# Patient Record
Sex: FEMALE | Race: WHITE | Employment: OTHER
[De-identification: names, ages, dates, MRNs, and addresses within clinical notes are randomized per-mention and may not be internally consistent; named-entity substitution may affect disease eponyms.]

---

## 2024-02-19 ENCOUNTER — HOSPITAL ENCOUNTER (OUTPATIENT)
Facility: HOSPITAL | Age: 74
Discharge: HOME | End: 2024-02-19
Attending: FAMILY MEDICINE
Payer: MEDICARE

## 2024-02-19 VITALS
RESPIRATION RATE: 18 BRPM | OXYGEN SATURATION: 91 % | DIASTOLIC BLOOD PRESSURE: 70 MMHG | TEMPERATURE: 97.3 F | SYSTOLIC BLOOD PRESSURE: 123 MMHG | HEART RATE: 76 BPM

## 2024-02-19 DIAGNOSIS — L97.911 CHRONIC ULCER OF RIGHT LEG, LIMITED TO BREAKDOWN OF SKIN (HCC): ICD-10-CM

## 2024-02-19 DIAGNOSIS — L97.522 DIABETIC ULCER OF TOE OF LEFT FOOT ASSOCIATED WITH TYPE 2 DIABETES MELLITUS, WITH FAT LAYER EXPOSED (HCC): ICD-10-CM

## 2024-02-19 DIAGNOSIS — L97.921 CHRONIC ULCER OF LEFT LEG, LIMITED TO BREAKDOWN OF SKIN (HCC): Primary | ICD-10-CM

## 2024-02-19 DIAGNOSIS — E11.621 DIABETIC ULCER OF TOE OF LEFT FOOT ASSOCIATED WITH TYPE 2 DIABETES MELLITUS, WITH FAT LAYER EXPOSED (HCC): ICD-10-CM

## 2024-02-19 PROCEDURE — 87186 SC STD MICRODIL/AGAR DIL: CPT

## 2024-02-19 PROCEDURE — 29581 APPL MULTLAYER CMPRN SYS LEG: CPT

## 2024-02-19 PROCEDURE — 87070 CULTURE OTHR SPECIMN AEROBIC: CPT

## 2024-02-19 PROCEDURE — 97602 WOUND(S) CARE NON-SELECTIVE: CPT

## 2024-02-19 PROCEDURE — 87077 CULTURE AEROBIC IDENTIFY: CPT

## 2024-02-19 PROCEDURE — 87147 CULTURE TYPE IMMUNOLOGIC: CPT

## 2024-02-19 PROCEDURE — 87075 CULTR BACTERIA EXCEPT BLOOD: CPT

## 2024-02-19 PROCEDURE — 87205 SMEAR GRAM STAIN: CPT

## 2024-02-19 RX ORDER — BACITRACIN ZINC AND POLYMYXIN B SULFATE 500; 1000 [USP'U]/G; [USP'U]/G
OINTMENT TOPICAL ONCE
OUTPATIENT
Start: 2024-02-19 | End: 2024-02-19

## 2024-02-19 RX ORDER — LIDOCAINE HYDROCHLORIDE 20 MG/ML
JELLY TOPICAL ONCE
OUTPATIENT
Start: 2024-02-19 | End: 2024-02-19

## 2024-02-19 RX ORDER — BETAMETHASONE DIPROPIONATE 0.5 MG/G
CREAM TOPICAL ONCE
OUTPATIENT
Start: 2024-02-19 | End: 2024-02-19

## 2024-02-19 RX ORDER — CLOBETASOL PROPIONATE 0.5 MG/G
OINTMENT TOPICAL ONCE
OUTPATIENT
Start: 2024-02-19 | End: 2024-02-19

## 2024-02-19 RX ORDER — IBUPROFEN 200 MG
TABLET ORAL ONCE
OUTPATIENT
Start: 2024-02-19 | End: 2024-02-19

## 2024-02-19 RX ORDER — LIDOCAINE 40 MG/G
CREAM TOPICAL ONCE
OUTPATIENT
Start: 2024-02-19 | End: 2024-02-19

## 2024-02-19 RX ORDER — SODIUM CHLOR/HYPOCHLOROUS ACID 0.033 %
SOLUTION, IRRIGATION IRRIGATION ONCE
OUTPATIENT
Start: 2024-02-19 | End: 2024-02-19

## 2024-02-19 RX ORDER — LIDOCAINE HYDROCHLORIDE 40 MG/ML
SOLUTION TOPICAL ONCE
OUTPATIENT
Start: 2024-02-19 | End: 2024-02-19

## 2024-02-19 RX ORDER — LIDOCAINE 50 MG/G
OINTMENT TOPICAL ONCE
OUTPATIENT
Start: 2024-02-19 | End: 2024-02-19

## 2024-02-19 RX ORDER — TRIAMCINOLONE ACETONIDE 1 MG/G
OINTMENT TOPICAL ONCE
OUTPATIENT
Start: 2024-02-19 | End: 2024-02-19

## 2024-02-19 RX ORDER — GINSENG 100 MG
CAPSULE ORAL ONCE
OUTPATIENT
Start: 2024-02-19 | End: 2024-02-19

## 2024-02-19 RX ORDER — GENTAMICIN SULFATE 1 MG/G
OINTMENT TOPICAL ONCE
OUTPATIENT
Start: 2024-02-19 | End: 2024-02-19

## 2024-02-19 NOTE — PROGRESS NOTES
Wound Center  Progress Note / Procedure Note      Chief Complaint:  Shanna Edwards is a 73 y.o.  female  with lower leg wound of >few months duration.    Referred by:  Dr POLI Huddleston DO      Assessment/Plan     73 y.o. female with     -R leg circumferential chronic ulcer.  Partial thickness  ++drainage  Red,raw, weeping++  Culture done    -L leg circumferential chronic ulcer.  Partial thickness  ++drainage  Red,raw, weeping++  Culture done    -L second toe diabetic ulcer  +pressure component  Great toenail digging into this area  Betadine wet to dry snaked through toes    -R Index finger chronic ulcer  Full thickness  Dry granular      -DM2  A1c 5.9  (10/2023)    -CKD  eGFR 31 (2/2024)    -Toenails long and curved  Causing wounds  Refer to podiatry- Dr Naylor    Following discussed with patient   Needs :  Serial debridement- prn    Good local wound care  Dressing:see nursing notes  Frequency : once weekly      -Edema management    -Good Diabetic control    -Good offloading    Patient/  understood and agrees with plan. Questions answered.    Serial visits and serial debridements also discussed.    Follow up with me in 1 week    Subjective:       HPI:     Had had wounds/weeping since September  Was too involved in taking care of spouse and neglected own health  Spouse has passed away  Pcp referred her here    Had denuded weeping raw legs bilaterally  Culture done    Alginate, 2 layer coflect    Left 2nd toe DFU  Great toeNail digging into second toe  Betadine wet to dry snaked through webspaces  Refer to Dr Naylor      History/Chart/Medications reviewed    Wound caused by:  venous, diabetic  Current wound care:See flowsheet  Offloading wound: instructions given today  Elevating legs: instructions given today  Compression compliance:instructions given today  Appetite: fair  Wound associated pain: See flowsheet  Diabetic: yes  Smoker: no  ROS: no N/V/D, no T/chills; no local rash, no chest pain +shortness

## 2024-02-21 LAB
BACTERIA SPEC CULT: NORMAL
SERVICE CMNT-IMP: NORMAL

## 2024-02-22 LAB
BACTERIA SPEC CULT: ABNORMAL
GRAM STN SPEC: ABNORMAL
GRAM STN SPEC: ABNORMAL
SERVICE CMNT-IMP: ABNORMAL

## 2024-02-22 NOTE — FLOWSHEET NOTE
02/19/24 1048   Wound 02/19/24 Pretibial Left;Anterior   Date First Assessed/Time First Assessed: 02/19/24 1047   Present on Original Admission: Yes  Primary Wound Type: Venous Ulcer  Location: Pretibial  Wound Location Orientation: Left;Anterior   Wound Image    Wound Etiology Venous   Dressing Status New dressing applied   Wound Cleansed Soap and water;Wound cleanser   Dressing/Treatment Hydrofiber Ag  (superabsorbent dressings and 2 layer coflex)   Offloading for Diabetic Foot Ulcers Offloading not required   Dressing Change Due 02/26/24   Wound Length (cm) 23 cm   Wound Width (cm) 17 cm   Wound Depth (cm) 0.1 cm   Wound Surface Area (cm^2) 391 cm^2   Wound Volume (cm^3) 39.1 cm^3   Wound Assessment Pink/red;Superficial   Drainage Amount Moderate (25-50%)   Drainage Description Serous   Odor Mild   Jaylin-wound Assessment Blanchable erythema   Margins Attached edges   Wound Thickness Description not for Pressure Injury Partial thickness   Wound 02/19/24 Toe (Comment  which one) Left;Dorsal 2nd toe   Date First Assessed/Time First Assessed: 02/19/24 1047   Present on Original Admission: Yes  Primary Wound Type: Diabetic Ulcer  Location: Toe (Comment  which one)  Wound Location Orientation: Left;Dorsal  Wound Description (Comments): 2nd toe   Wound Image    Wound Etiology Diabetic   Dressing Status New dressing applied   Wound Cleansed Wound cleanser;Soap and water   Dressing/Treatment Hydrofiber Ag   Offloading for Diabetic Foot Ulcers Felt or foam   Dressing Change Due 02/26/24   Wound Length (cm) 0.5 cm   Wound Width (cm) 1.5 cm   Wound Depth (cm) 0.2 cm   Wound Surface Area (cm^2) 0.75 cm^2   Wound Volume (cm^3) 0.15 cm^3   Wound Assessment Slough   Drainage Amount Small (< 25%)   Drainage Description Serous   Odor Mild   Jaylin-wound Assessment Blanchable erythema   Margins Defined edges   Wound Thickness Description not for Pressure Injury Full thickness   Wound 02/19/24 Pretibial Right;Dorsal   Date First

## 2024-02-22 NOTE — DISCHARGE INSTRUCTIONS
Discharge Instructions from  [facility]  [address] [address] [phone] Fax [phone]    Do not remove dressing     Return Appointment:  [] Wound and dressing supply provider:   [] ECF or Home Healthcare:  [] Wound Assessment: [] Physician or NP scheduled for Wound Assessment:   [x] Return Appointment: With MD  in  1 Week(s)  [x] Call podiatry to schedule appt for toe nail trimming       Wound Care Center Information: Should you experience any significant changes in your wound(s) or have questions about your wound care, please contact the [facility] at MONDAY - FRIDAY 8:00 am - 4:30.  If you need help with your wound outside these hours and cannot wait until we are again available, contact your PCP or go to the hospital emergency room.     PLEASE NOTE: IF YOU ARE UNABLE TO OBTAIN WOUND SUPPLIES, CONTINUE TO USE THE SUPPLIES YOU HAVE AVAILABLE UNTIL YOU ARE ABLE TO REACH US. IT IS MOST IMPORTANT TO KEEP THE WOUND COVERED AT ALL TIMES.

## 2024-02-26 ENCOUNTER — HOSPITAL ENCOUNTER (OUTPATIENT)
Facility: HOSPITAL | Age: 74
Discharge: HOME | End: 2024-02-26
Attending: FAMILY MEDICINE
Payer: MEDICARE

## 2024-02-26 VITALS
SYSTOLIC BLOOD PRESSURE: 108 MMHG | TEMPERATURE: 97.6 F | HEART RATE: 78 BPM | DIASTOLIC BLOOD PRESSURE: 61 MMHG | OXYGEN SATURATION: 92 % | RESPIRATION RATE: 18 BRPM

## 2024-02-26 DIAGNOSIS — E11.621 DIABETIC ULCER OF TOE OF LEFT FOOT ASSOCIATED WITH TYPE 2 DIABETES MELLITUS, WITH FAT LAYER EXPOSED (HCC): ICD-10-CM

## 2024-02-26 DIAGNOSIS — L97.911 CHRONIC ULCER OF RIGHT LEG, LIMITED TO BREAKDOWN OF SKIN (HCC): ICD-10-CM

## 2024-02-26 DIAGNOSIS — L97.921 CHRONIC ULCER OF LEFT LEG, LIMITED TO BREAKDOWN OF SKIN (HCC): Primary | ICD-10-CM

## 2024-02-26 DIAGNOSIS — L97.522 DIABETIC ULCER OF TOE OF LEFT FOOT ASSOCIATED WITH TYPE 2 DIABETES MELLITUS, WITH FAT LAYER EXPOSED (HCC): ICD-10-CM

## 2024-02-26 PROBLEM — R06.02 SHORTNESS OF BREATH: Status: ACTIVE | Noted: 2022-12-25

## 2024-02-26 PROBLEM — E55.9 VITAMIN D DEFICIENCY: Status: ACTIVE | Noted: 2019-06-14

## 2024-02-26 PROBLEM — I35.0 NONRHEUMATIC AORTIC VALVE STENOSIS: Status: ACTIVE | Noted: 2021-01-11

## 2024-02-26 PROBLEM — E66.01 SEVERE OBESITY (BMI 35.0-39.9) WITH COMORBIDITY (HCC): Status: ACTIVE | Noted: 2017-06-21

## 2024-02-26 PROBLEM — H04.123 DRY EYE SYNDROME OF BOTH EYES: Status: ACTIVE | Noted: 2021-03-05

## 2024-02-26 PROBLEM — I27.20 PULMONARY HYPERTENSION (HCC): Status: ACTIVE | Noted: 2023-08-25

## 2024-02-26 PROBLEM — I65.29 STENOSIS OF CAROTID ARTERY: Status: ACTIVE | Noted: 2019-04-10

## 2024-02-26 PROCEDURE — 29581 APPL MULTLAYER CMPRN SYS LEG: CPT

## 2024-02-26 RX ORDER — LIDOCAINE HYDROCHLORIDE 20 MG/ML
JELLY TOPICAL ONCE
OUTPATIENT
Start: 2024-02-26 | End: 2024-02-26

## 2024-02-26 RX ORDER — TRIAMCINOLONE ACETONIDE 1 MG/G
OINTMENT TOPICAL ONCE
OUTPATIENT
Start: 2024-02-26 | End: 2024-02-26

## 2024-02-26 RX ORDER — LIDOCAINE 50 MG/G
OINTMENT TOPICAL ONCE
OUTPATIENT
Start: 2024-02-26 | End: 2024-02-26

## 2024-02-26 RX ORDER — DOXYCYCLINE HYCLATE 100 MG
100 TABLET ORAL 2 TIMES DAILY
Qty: 28 TABLET | Refills: 0 | Status: SHIPPED | OUTPATIENT
Start: 2024-02-26 | End: 2024-03-11

## 2024-02-26 RX ORDER — LIDOCAINE 40 MG/G
CREAM TOPICAL ONCE
OUTPATIENT
Start: 2024-02-26 | End: 2024-02-26

## 2024-02-26 RX ORDER — BETAMETHASONE DIPROPIONATE 0.5 MG/G
CREAM TOPICAL ONCE
OUTPATIENT
Start: 2024-02-26 | End: 2024-02-26

## 2024-02-26 RX ORDER — GINSENG 100 MG
CAPSULE ORAL ONCE
OUTPATIENT
Start: 2024-02-26 | End: 2024-02-26

## 2024-02-26 RX ORDER — IBUPROFEN 200 MG
TABLET ORAL ONCE
OUTPATIENT
Start: 2024-02-26 | End: 2024-02-26

## 2024-02-26 RX ORDER — SODIUM CHLOR/HYPOCHLOROUS ACID 0.033 %
SOLUTION, IRRIGATION IRRIGATION ONCE
OUTPATIENT
Start: 2024-02-26 | End: 2024-02-26

## 2024-02-26 RX ORDER — GENTAMICIN SULFATE 1 MG/G
OINTMENT TOPICAL ONCE
OUTPATIENT
Start: 2024-02-26 | End: 2024-02-26

## 2024-02-26 RX ORDER — LIDOCAINE HYDROCHLORIDE 40 MG/ML
SOLUTION TOPICAL ONCE
OUTPATIENT
Start: 2024-02-26 | End: 2024-02-26

## 2024-02-26 RX ORDER — BACITRACIN ZINC AND POLYMYXIN B SULFATE 500; 1000 [USP'U]/G; [USP'U]/G
OINTMENT TOPICAL ONCE
OUTPATIENT
Start: 2024-02-26 | End: 2024-02-26

## 2024-02-26 RX ORDER — CLOBETASOL PROPIONATE 0.5 MG/G
OINTMENT TOPICAL ONCE
OUTPATIENT
Start: 2024-02-26 | End: 2024-02-26

## 2024-02-26 NOTE — DISCHARGE INSTRUCTIONS
Compression Wraps Discharge Instructions   Location: Bilateral Legs  Type: 2 layer wraps    Your doctor has ordered compression therapy for your wound. Compression bandages reduce the swelling, or edema, in your legs and prevent it from returning. The wound care staff will apply your compression wrap. It must be removed and re-applied at least weekly. As the swelling decreases, the boot no longer provides adequate compression and you need a new one. Once applied, you need to know how to take care of your compression wrap.     The boot must stay dry. Do not get it wet in the shower or tub. You may do a partial bath, or you can cover the boot with a large plastic bag, secured at the top, so that no water can get in.    Avoid standing in one place for long periods of time. If you must  one place, shift your weight and change positions often.    If you have CHF, consult your doctor before following the next two recommendations for leg elevation.     When sitting, elevate your legs on pillows, or put blocks under the foot of your bed. Your legs should be higher than your heart.    If your boot becomes painful, or you notice an increase in swelling in your toes, numbness or tingling, or purple color to your toes, remove the wrap and call the Wound Care Center. If it is after hours, call your doctor for instructions or go to the nearest emergency room.

## 2024-02-26 NOTE — FLOWSHEET NOTE
02/26/24 1059   Wound 02/19/24 Pretibial Left;Anterior   Date First Assessed/Time First Assessed: 02/19/24 1047   Present on Original Admission: Yes  Primary Wound Type: Venous Ulcer  Location: Pretibial  Wound Location Orientation: Left;Anterior   Wound Image      Wound Etiology Venous   Dressing Status Intact   Wound Cleansed Cleansed with saline   Dressing/Treatment Hydrofiber Ag  (2 layer wrap)   Offloading for Diabetic Foot Ulcers Offloading not required   Dressing Change Due 03/04/24   Wound Length (cm) 23 cm   Wound Width (cm) 17 cm   Wound Depth (cm) 0.1 cm   Wound Surface Area (cm^2) 391 cm^2   Change in Wound Size % (l*w) 0   Wound Volume (cm^3) 39.1 cm^3   Wound Healing % 0   Wound Assessment Pink/red   Drainage Amount Moderate (25-50%)   Drainage Description Serous   Odor None   Jaylin-wound Assessment Blanchable erythema   Margins Attached edges   Wound Thickness Description not for Pressure Injury Partial thickness   Wound 02/19/24 Toe (Comment  which one) Left;Dorsal 2nd toe   Date First Assessed/Time First Assessed: 02/19/24 1047   Present on Original Admission: Yes  Primary Wound Type: Diabetic Ulcer  Location: Toe (Comment  which one)  Wound Location Orientation: Left;Dorsal  Wound Description (Comments): 2nd toe   Wound Image    Wound Etiology Diabetic   Dressing Status Intact   Wound Cleansed Wound cleanser   Dressing/Treatment Betadine swabs/povidone iodine   Offloading for Diabetic Foot Ulcers Felt or foam   Dressing Change Due 03/04/24   Wound Length (cm) 0.4 cm   Wound Width (cm) 1.5 cm   Wound Depth (cm) 0.2 cm   Wound Surface Area (cm^2) 0.6 cm^2   Change in Wound Size % (l*w) 20   Wound Volume (cm^3) 0.12 cm^3   Wound Healing % 20   Wound Assessment Devitalized tissue   Drainage Amount Small (< 25%)   Drainage Description Serous   Odor None   Jaylin-wound Assessment Blanchable erythema   Margins Defined edges   Wound Thickness Description not for Pressure Injury Full thickness   Wound

## 2024-02-27 NOTE — PROGRESS NOTES
1059   Wound Healing % 0 02/26/24 1059   Wound Assessment Pink/red 02/26/24 1059   Drainage Amount Moderate (25-50%) 02/26/24 1059   Drainage Description Serous 02/26/24 1059   Odor None 02/26/24 1059   Jaylin-wound Assessment Blanchable erythema 02/26/24 1059   Margins Attached edges 02/26/24 1059   Wound Thickness Description not for Pressure Injury Partial thickness 02/26/24 1059   Number of days: 8       Wound 02/19/24 Finger (Comment which one) Right (Active)   Wound Image   02/26/24 1059   Wound Etiology Other 02/26/24 1059   Dressing Status New dressing applied 02/26/24 1059   Wound Cleansed Wound cleanser 02/26/24 1059   Dressing/Treatment Hydrofiber Ag 02/26/24 1059   Offloading for Diabetic Foot Ulcers Offloading not required 02/26/24 1059   Dressing Change Due 03/04/24 02/26/24 1059   Wound Length (cm) 0.5 cm 02/26/24 1059   Wound Width (cm) 0.5 cm 02/26/24 1059   Wound Depth (cm) 0.2 cm 02/26/24 1059   Wound Surface Area (cm^2) 0.25 cm^2 02/26/24 1059   Change in Wound Size % (l*w) 0 02/26/24 1059   Wound Volume (cm^3) 0.05 cm^3 02/26/24 1059   Wound Healing % 0 02/26/24 1059   Wound Assessment Pink/red 02/26/24 1059   Drainage Amount Scant (moist but unmeasurable) 02/26/24 1059   Drainage Description Serous 02/26/24 1059   Odor None 02/26/24 1059   Jaylin-wound Assessment Intact 02/26/24 1059   Margins Defined edges 02/26/24 1059   Wound Thickness Description not for Pressure Injury Full thickness 02/26/24 1059   Number of days: 8          -------    Past Medical History:   Diagnosis Date    Atrial fibrillation (HCC)     Diabetes mellitus (HCC)     Hyperlipidemia     Hypertension      Past Surgical History:   Procedure Laterality Date    HYSTERECTOMY (CERVIX STATUS UNKNOWN)       No family history on file.  Social History     Socioeconomic History    Marital status:    Tobacco Use    Smoking status: Never    Smokeless tobacco: Never   Substance and Sexual Activity    Alcohol use: Not Currently

## 2024-03-04 ENCOUNTER — HOSPITAL ENCOUNTER (OUTPATIENT)
Facility: HOSPITAL | Age: 74
Discharge: HOME | End: 2024-03-04
Attending: FAMILY MEDICINE
Payer: MEDICARE

## 2024-03-04 VITALS
SYSTOLIC BLOOD PRESSURE: 131 MMHG | RESPIRATION RATE: 18 BRPM | HEART RATE: 75 BPM | DIASTOLIC BLOOD PRESSURE: 73 MMHG | TEMPERATURE: 97.5 F

## 2024-03-04 PROCEDURE — 29581 APPL MULTLAYER CMPRN SYS LEG: CPT

## 2024-03-04 NOTE — DISCHARGE INSTRUCTIONS
Discharge Instructions from  [facility] [address] [phone] Fax [phone]    Do not remove dressing     Return Appointment:  [] Wound and dressing supply provider:   [] ECF or Home Healthcare:  [] Wound Assessment: [] Physician or NP scheduled for Wound Assessment:   [x] Return Appointment: With MD  in  1 Week(s)  [] Ordered tests:       Wound Care Center Information: Should you experience any significant changes in your wound(s) or have questions about your wound care, please contact the [facility] at MONDAY - FRIDAY 8:00 am - 4:30.  If you need help with your wound outside these hours and cannot wait until we are again available, contact your PCP or go to the hospital emergency room.     PLEASE NOTE: IF YOU ARE UNABLE TO OBTAIN WOUND SUPPLIES, CONTINUE TO USE THE SUPPLIES YOU HAVE AVAILABLE UNTIL YOU ARE ABLE TO REACH US. IT IS MOST IMPORTANT TO KEEP THE WOUND COVERED AT ALL TIMES.

## 2024-03-04 NOTE — FLOWSHEET NOTE
03/04/24 1052   Wound 02/19/24 Pretibial Left;Anterior   Date First Assessed/Time First Assessed: 02/19/24 1047   Present on Original Admission: Yes  Primary Wound Type: Venous Ulcer  Location: Pretibial  Wound Location Orientation: Left;Anterior   Wound Image    Wound Etiology Venous   Dressing Status Intact   Wound Cleansed Cleansed with saline   Dressing/Treatment Hydrofiber Ag  (2 layer wrap)   Offloading for Diabetic Foot Ulcers Offloading not required   Dressing Change Due 03/11/24   Wound Length (cm) 23 cm   Wound Width (cm) 17 cm   Wound Depth (cm) 0.1 cm   Wound Surface Area (cm^2) 391 cm^2   Change in Wound Size % (l*w) 0   Wound Volume (cm^3) 39.1 cm^3   Wound Healing % 0   Wound Assessment Pink/red   Drainage Amount Moderate (25-50%)   Drainage Description Serous   Odor None   Jaylin-wound Assessment Blanchable erythema   Margins Attached edges   Wound Thickness Description not for Pressure Injury Partial thickness   Wound 02/19/24 Toe (Comment  which one) Left;Dorsal 2nd toe   Date First Assessed/Time First Assessed: 02/19/24 1047   Present on Original Admission: Yes  Primary Wound Type: Diabetic Ulcer  Location: Toe (Comment  which one)  Wound Location Orientation: Left;Dorsal  Wound Description (Comments): 2nd toe   Wound Image    Wound Etiology Diabetic   Dressing Status Intact   Wound Cleansed Wound cleanser   Dressing/Treatment Betadine swabs/povidone iodine   Offloading for Diabetic Foot Ulcers Felt or foam   Dressing Change Due 03/11/24   Wound Length (cm)   (clinically closed)   Wound Assessment Epithelialization   Drainage Amount None (dry)   Odor None   Wound 02/19/24 Pretibial Right;Dorsal   Date First Assessed/Time First Assessed: 02/19/24 1047   Present on Original Admission: Yes  Primary Wound Type: Venous Ulcer  Location: Pretibial  Wound Location Orientation: Right;Dorsal   Wound Image    Wound Etiology Venous   Dressing Status Reinforced dressing   Wound Cleansed Cleansed with saline

## 2024-03-11 ENCOUNTER — HOSPITAL ENCOUNTER (OUTPATIENT)
Facility: HOSPITAL | Age: 74
Discharge: HOME | End: 2024-03-11
Attending: FAMILY MEDICINE
Payer: MEDICARE

## 2024-03-11 VITALS
RESPIRATION RATE: 18 BRPM | DIASTOLIC BLOOD PRESSURE: 51 MMHG | OXYGEN SATURATION: 93 % | HEART RATE: 74 BPM | SYSTOLIC BLOOD PRESSURE: 119 MMHG

## 2024-03-11 DIAGNOSIS — L97.522 DIABETIC ULCER OF TOE OF LEFT FOOT ASSOCIATED WITH TYPE 2 DIABETES MELLITUS, WITH FAT LAYER EXPOSED (HCC): ICD-10-CM

## 2024-03-11 DIAGNOSIS — E11.621 DIABETIC ULCER OF TOE OF LEFT FOOT ASSOCIATED WITH TYPE 2 DIABETES MELLITUS, WITH FAT LAYER EXPOSED (HCC): ICD-10-CM

## 2024-03-11 DIAGNOSIS — L97.921 CHRONIC ULCER OF LEFT LEG, LIMITED TO BREAKDOWN OF SKIN (HCC): Primary | ICD-10-CM

## 2024-03-11 DIAGNOSIS — L97.911 CHRONIC ULCER OF RIGHT LEG, LIMITED TO BREAKDOWN OF SKIN (HCC): ICD-10-CM

## 2024-03-11 PROCEDURE — 97602 WOUND(S) CARE NON-SELECTIVE: CPT

## 2024-03-11 RX ORDER — LIDOCAINE 50 MG/G
OINTMENT TOPICAL ONCE
OUTPATIENT
Start: 2024-03-11 | End: 2024-03-11

## 2024-03-11 RX ORDER — LIDOCAINE HYDROCHLORIDE 40 MG/ML
SOLUTION TOPICAL ONCE
OUTPATIENT
Start: 2024-03-11 | End: 2024-03-11

## 2024-03-11 RX ORDER — IBUPROFEN 200 MG
TABLET ORAL ONCE
OUTPATIENT
Start: 2024-03-11 | End: 2024-03-11

## 2024-03-11 RX ORDER — BACITRACIN ZINC AND POLYMYXIN B SULFATE 500; 1000 [USP'U]/G; [USP'U]/G
OINTMENT TOPICAL ONCE
OUTPATIENT
Start: 2024-03-11 | End: 2024-03-11

## 2024-03-11 RX ORDER — BACITRACIN ZINC AND POLYMYXIN B SULFATE 500; 1000 [USP'U]/G; [USP'U]/G
OINTMENT TOPICAL ONCE
Status: CANCELLED | OUTPATIENT
Start: 2024-03-11 | End: 2024-03-11

## 2024-03-11 RX ORDER — BETAMETHASONE DIPROPIONATE 0.5 MG/G
CREAM TOPICAL ONCE
OUTPATIENT
Start: 2024-03-11 | End: 2024-03-11

## 2024-03-11 RX ORDER — GINSENG 100 MG
CAPSULE ORAL ONCE
Status: CANCELLED | OUTPATIENT
Start: 2024-03-11 | End: 2024-03-11

## 2024-03-11 RX ORDER — LIDOCAINE HYDROCHLORIDE 40 MG/ML
SOLUTION TOPICAL ONCE
Status: CANCELLED | OUTPATIENT
Start: 2024-03-11 | End: 2024-03-11

## 2024-03-11 RX ORDER — GINSENG 100 MG
CAPSULE ORAL ONCE
OUTPATIENT
Start: 2024-03-11 | End: 2024-03-11

## 2024-03-11 RX ORDER — LIDOCAINE HYDROCHLORIDE 20 MG/ML
JELLY TOPICAL ONCE
Status: CANCELLED | OUTPATIENT
Start: 2024-03-11 | End: 2024-03-11

## 2024-03-11 RX ORDER — TRIAMCINOLONE ACETONIDE 1 MG/G
OINTMENT TOPICAL ONCE
Status: CANCELLED | OUTPATIENT
Start: 2024-03-11 | End: 2024-03-11

## 2024-03-11 RX ORDER — CLOBETASOL PROPIONATE 0.5 MG/G
OINTMENT TOPICAL ONCE
OUTPATIENT
Start: 2024-03-11 | End: 2024-03-11

## 2024-03-11 RX ORDER — IBUPROFEN 200 MG
TABLET ORAL ONCE
Status: CANCELLED | OUTPATIENT
Start: 2024-03-11 | End: 2024-03-11

## 2024-03-11 RX ORDER — GENTAMICIN SULFATE 1 MG/G
OINTMENT TOPICAL ONCE
OUTPATIENT
Start: 2024-03-11 | End: 2024-03-11

## 2024-03-11 RX ORDER — SODIUM CHLOR/HYPOCHLOROUS ACID 0.033 %
SOLUTION, IRRIGATION IRRIGATION ONCE
Status: CANCELLED | OUTPATIENT
Start: 2024-03-11 | End: 2024-03-11

## 2024-03-11 RX ORDER — BETAMETHASONE DIPROPIONATE 0.5 MG/G
CREAM TOPICAL ONCE
Status: CANCELLED | OUTPATIENT
Start: 2024-03-11 | End: 2024-03-11

## 2024-03-11 RX ORDER — LIDOCAINE 40 MG/G
CREAM TOPICAL ONCE
OUTPATIENT
Start: 2024-03-11 | End: 2024-03-11

## 2024-03-11 RX ORDER — TRIAMCINOLONE ACETONIDE 1 MG/G
OINTMENT TOPICAL ONCE
OUTPATIENT
Start: 2024-03-11 | End: 2024-03-11

## 2024-03-11 RX ORDER — CLOBETASOL PROPIONATE 0.5 MG/G
OINTMENT TOPICAL ONCE
Status: CANCELLED | OUTPATIENT
Start: 2024-03-11 | End: 2024-03-11

## 2024-03-11 RX ORDER — LIDOCAINE 50 MG/G
OINTMENT TOPICAL ONCE
Status: CANCELLED | OUTPATIENT
Start: 2024-03-11 | End: 2024-03-11

## 2024-03-11 RX ORDER — LIDOCAINE HYDROCHLORIDE 20 MG/ML
JELLY TOPICAL ONCE
OUTPATIENT
Start: 2024-03-11 | End: 2024-03-11

## 2024-03-11 RX ORDER — SODIUM CHLOR/HYPOCHLOROUS ACID 0.033 %
SOLUTION, IRRIGATION IRRIGATION ONCE
OUTPATIENT
Start: 2024-03-11 | End: 2024-03-11

## 2024-03-11 RX ORDER — LIDOCAINE 40 MG/G
CREAM TOPICAL ONCE
Status: CANCELLED | OUTPATIENT
Start: 2024-03-11 | End: 2024-03-11

## 2024-03-11 RX ORDER — GENTAMICIN SULFATE 1 MG/G
OINTMENT TOPICAL ONCE
Status: CANCELLED | OUTPATIENT
Start: 2024-03-11 | End: 2024-03-11

## 2024-03-11 NOTE — DISCHARGE INSTRUCTIONS
ABLE TO REACH US. IT IS MOST IMPORTANT TO KEEP THE WOUND COVERED AT ALL TIMES.     Physician Signature:_______________________    Date: ___________ Time:  ____________

## 2024-03-11 NOTE — FLOWSHEET NOTE
03/11/24 1103   Wound 02/19/24 Pretibial Left;Anterior   Date First Assessed/Time First Assessed: 02/19/24 1047   Present on Original Admission: Yes  Primary Wound Type: Venous Ulcer  Location: Pretibial  Wound Location Orientation: Left;Anterior   Wound Image    Wound Etiology Venous   Dressing Status Intact   Wound Cleansed Cleansed with saline   Dressing/Treatment   (double layer tubigrip size F)   Offloading for Diabetic Foot Ulcers Offloading not required   Dressing Change Due 03/18/24   Wound Length (cm)   (small scattered areas)   Wound Assessment Pink/red   Drainage Amount Scant (moist but unmeasurable)   Drainage Description Serous   Odor None   Jaylin-wound Assessment Blanchable erythema   Margins Attached edges   Wound Thickness Description not for Pressure Injury Partial thickness   Wound 02/19/24 Toe (Comment  which one) Left;Dorsal 2nd toe   Date First Assessed/Time First Assessed: 02/19/24 1047   Present on Original Admission: Yes  Primary Wound Type: Diabetic Ulcer  Location: Toe (Comment  which one)  Wound Location Orientation: Left;Dorsal  Wound Description (Comments): 2nd toe   Wound Image    Wound Etiology Diabetic   Dressing Status Intact   Wound Cleansed Wound cleanser   Dressing/Treatment Betadine swabs/povidone iodine   Dressing Change Due 03/18/24   Wound Length (cm)   (remains clinically closed)   Wound Assessment Epithelialization   Drainage Amount None (dry)   Odor None   Wound 02/19/24 Pretibial Right;Dorsal   Date First Assessed/Time First Assessed: 02/19/24 1047   Present on Original Admission: Yes  Primary Wound Type: Venous Ulcer  Location: Pretibial  Wound Location Orientation: Right;Dorsal   Wound Image     Wound Etiology Venous   Dressing Status New dressing applied   Wound Cleansed Cleansed with saline   Dressing/Treatment   (double layer tubigrip Size F)   Offloading for Diabetic Foot Ulcers Offloading not required   Dressing Change Due 03/18/24   Wound Length (cm)   (small

## 2024-03-18 ENCOUNTER — HOSPITAL ENCOUNTER (OUTPATIENT)
Facility: HOSPITAL | Age: 74
Discharge: HOME | End: 2024-03-18
Attending: FAMILY MEDICINE
Payer: MEDICARE

## 2024-03-18 VITALS
DIASTOLIC BLOOD PRESSURE: 66 MMHG | OXYGEN SATURATION: 93 % | RESPIRATION RATE: 18 BRPM | HEART RATE: 67 BPM | TEMPERATURE: 97.4 F | SYSTOLIC BLOOD PRESSURE: 133 MMHG

## 2024-03-18 DIAGNOSIS — L97.522 DIABETIC ULCER OF TOE OF LEFT FOOT ASSOCIATED WITH TYPE 2 DIABETES MELLITUS, WITH FAT LAYER EXPOSED (HCC): ICD-10-CM

## 2024-03-18 DIAGNOSIS — L97.921 CHRONIC ULCER OF LEFT LEG, LIMITED TO BREAKDOWN OF SKIN (HCC): Primary | ICD-10-CM

## 2024-03-18 DIAGNOSIS — L97.911 CHRONIC ULCER OF RIGHT LEG, LIMITED TO BREAKDOWN OF SKIN (HCC): ICD-10-CM

## 2024-03-18 DIAGNOSIS — E11.621 DIABETIC ULCER OF TOE OF LEFT FOOT ASSOCIATED WITH TYPE 2 DIABETES MELLITUS, WITH FAT LAYER EXPOSED (HCC): ICD-10-CM

## 2024-03-18 PROCEDURE — 29581 APPL MULTLAYER CMPRN SYS LEG: CPT

## 2024-03-18 PROCEDURE — 6370000000 HC RX 637 (ALT 250 FOR IP): Performed by: FAMILY MEDICINE

## 2024-03-18 RX ORDER — LIDOCAINE 40 MG/G
CREAM TOPICAL ONCE
OUTPATIENT
Start: 2024-03-18 | End: 2024-03-18

## 2024-03-18 RX ORDER — SODIUM CHLOR/HYPOCHLOROUS ACID 0.033 %
SOLUTION, IRRIGATION IRRIGATION ONCE
OUTPATIENT
Start: 2024-03-18 | End: 2024-03-18

## 2024-03-18 RX ORDER — LIDOCAINE HYDROCHLORIDE 40 MG/ML
SOLUTION TOPICAL ONCE
OUTPATIENT
Start: 2024-03-18 | End: 2024-03-18

## 2024-03-18 RX ORDER — BETAMETHASONE DIPROPIONATE 0.5 MG/G
CREAM TOPICAL ONCE
OUTPATIENT
Start: 2024-03-18 | End: 2024-03-18

## 2024-03-18 RX ORDER — LIDOCAINE 50 MG/G
OINTMENT TOPICAL ONCE
OUTPATIENT
Start: 2024-03-18 | End: 2024-03-18

## 2024-03-18 RX ORDER — CLOBETASOL PROPIONATE 0.5 MG/G
OINTMENT TOPICAL ONCE
OUTPATIENT
Start: 2024-03-18 | End: 2024-03-18

## 2024-03-18 RX ORDER — GENTAMICIN SULFATE 1 MG/G
OINTMENT TOPICAL ONCE
OUTPATIENT
Start: 2024-03-18 | End: 2024-03-18

## 2024-03-18 RX ORDER — BACITRACIN ZINC AND POLYMYXIN B SULFATE 500; 1000 [USP'U]/G; [USP'U]/G
OINTMENT TOPICAL ONCE
OUTPATIENT
Start: 2024-03-18 | End: 2024-03-18

## 2024-03-18 RX ORDER — TRIAMCINOLONE ACETONIDE 1 MG/G
OINTMENT TOPICAL ONCE
OUTPATIENT
Start: 2024-03-18 | End: 2024-03-18

## 2024-03-18 RX ORDER — LIDOCAINE HYDROCHLORIDE 20 MG/ML
JELLY TOPICAL ONCE
OUTPATIENT
Start: 2024-03-18 | End: 2024-03-18

## 2024-03-18 RX ORDER — LIDOCAINE HYDROCHLORIDE 20 MG/ML
JELLY TOPICAL ONCE
Status: COMPLETED | OUTPATIENT
Start: 2024-03-18 | End: 2024-03-18

## 2024-03-18 RX ORDER — IBUPROFEN 200 MG
TABLET ORAL ONCE
OUTPATIENT
Start: 2024-03-18 | End: 2024-03-18

## 2024-03-18 RX ORDER — GINSENG 100 MG
CAPSULE ORAL ONCE
OUTPATIENT
Start: 2024-03-18 | End: 2024-03-18

## 2024-03-18 RX ADMIN — LIDOCAINE HYDROCHLORIDE: 20 JELLY TOPICAL at 11:02

## 2024-03-18 NOTE — PROGRESS NOTES
[facility] to [facility]  Dricing to [place]    Legs weeping a bit  Did not receive velcro wrap yet  Finger pain improved  Keep it covred

## 2024-03-21 NOTE — DISCHARGE INSTRUCTIONS
Discharge Instructions from  [place] [address] [phone] Fax [phone]    Do not remove dressing     Return Appointment:  [] Wound and dressing supply provider:   [] ECF or Home Healthcare:  [] Wound Assessment: [] Physician or NP scheduled for Wound Assessment:   [x] Return Appointment: With MD  in  1 Week(s)  [] Ordered tests:       Wound Care Center Information: Should you experience any significant changes in your wound(s) or have questions about your wound care, please contact the [facility] Center at MONDAY - FRIDAY 8:00 am - 4:30.  If you need help with your wound outside these hours and cannot wait until we are again available, contact your PCP or go to the hospital emergency room.     PLEASE NOTE: IF YOU ARE UNABLE TO OBTAIN WOUND SUPPLIES, CONTINUE TO USE THE SUPPLIES YOU HAVE AVAILABLE UNTIL YOU ARE ABLE TO REACH US. IT IS MOST IMPORTANT TO KEEP THE WOUND COVERED AT ALL TIMES.

## 2024-03-21 NOTE — FLOWSHEET NOTE
(scatt open areas)   Wound Assessment Pink/red   Drainage Amount Small (< 25%)   Drainage Description Serous   Odor None   Jaylin-wound Assessment Blanchable erythema   Wound Thickness Description not for Pressure Injury Partial thickness   Wound 02/19/24 Finger (Comment which one) Right   Date First Assessed/Time First Assessed: 02/19/24 0948   Present on Original Admission: Yes  Primary Wound Type: Other (comment)  Location: Finger (Comment which one)  Wound Location Orientation: Right   Wound Image    Wound Etiology Other   Dressing Status New dressing applied   Wound Cleansed Wound cleanser   Dressing/Treatment Antibacterial ointment;Gauze dressing/dressing sponge   Offloading for Diabetic Foot Ulcers Offloading not required   Dressing Change Due 03/25/24   Wound Length (cm) 0.3 cm   Wound Width (cm) 0.3 cm   Wound Depth (cm) 0.2 cm   Wound Surface Area (cm^2) 0.09 cm^2   Change in Wound Size % (l*w) 64   Wound Volume (cm^3) 0.018 cm^3   Wound Healing % 64   Post-Procedure Length (cm) 0.5 cm   Post-Procedure Width (cm) 0.5 cm   Post-Procedure Depth (cm) 0.2 cm   Post-Procedure Surface Area (cm^2) 0.25 cm^2   Post-Procedure Volume (cm^3) 0.05 cm^3   Wound Assessment Pink/red   Drainage Amount Scant (moist but unmeasurable)   Drainage Description Serosanguinous   Odor None   Jaylin-wound Assessment Intact   Margins Undefined edges   Wound Thickness Description not for Pressure Injury Full thickness      Admitted

## 2024-04-01 ENCOUNTER — HOSPITAL ENCOUNTER (OUTPATIENT)
Facility: HOSPITAL | Age: 74
Discharge: HOME | End: 2024-04-01
Attending: FAMILY MEDICINE
Payer: MEDICARE

## 2024-04-01 VITALS
HEART RATE: 63 BPM | OXYGEN SATURATION: 93 % | SYSTOLIC BLOOD PRESSURE: 114 MMHG | RESPIRATION RATE: 18 BRPM | DIASTOLIC BLOOD PRESSURE: 60 MMHG

## 2024-04-01 DIAGNOSIS — L97.911 CHRONIC ULCER OF RIGHT LEG, LIMITED TO BREAKDOWN OF SKIN (HCC): ICD-10-CM

## 2024-04-01 DIAGNOSIS — L97.522 DIABETIC ULCER OF TOE OF LEFT FOOT ASSOCIATED WITH TYPE 2 DIABETES MELLITUS, WITH FAT LAYER EXPOSED (HCC): ICD-10-CM

## 2024-04-01 DIAGNOSIS — L97.921 CHRONIC ULCER OF LEFT LEG, LIMITED TO BREAKDOWN OF SKIN (HCC): Primary | ICD-10-CM

## 2024-04-01 DIAGNOSIS — E11.621 DIABETIC ULCER OF TOE OF LEFT FOOT ASSOCIATED WITH TYPE 2 DIABETES MELLITUS, WITH FAT LAYER EXPOSED (HCC): ICD-10-CM

## 2024-04-01 PROCEDURE — 6370000000 HC RX 637 (ALT 250 FOR IP): Performed by: FAMILY MEDICINE

## 2024-04-01 PROCEDURE — 97602 WOUND(S) CARE NON-SELECTIVE: CPT

## 2024-04-01 RX ORDER — CLOBETASOL PROPIONATE 0.5 MG/G
OINTMENT TOPICAL ONCE
OUTPATIENT
Start: 2024-04-01 | End: 2024-04-01

## 2024-04-01 RX ORDER — TRIAMCINOLONE ACETONIDE 1 MG/G
OINTMENT TOPICAL ONCE
OUTPATIENT
Start: 2024-04-01 | End: 2024-04-01

## 2024-04-01 RX ORDER — IBUPROFEN 200 MG
TABLET ORAL ONCE
OUTPATIENT
Start: 2024-04-01 | End: 2024-04-01

## 2024-04-01 RX ORDER — LIDOCAINE HYDROCHLORIDE 20 MG/ML
JELLY TOPICAL ONCE
OUTPATIENT
Start: 2024-04-01 | End: 2024-04-01

## 2024-04-01 RX ORDER — LIDOCAINE 40 MG/G
CREAM TOPICAL ONCE
OUTPATIENT
Start: 2024-04-01 | End: 2024-04-01

## 2024-04-01 RX ORDER — SODIUM CHLOR/HYPOCHLOROUS ACID 0.033 %
SOLUTION, IRRIGATION IRRIGATION ONCE
OUTPATIENT
Start: 2024-04-01 | End: 2024-04-01

## 2024-04-01 RX ORDER — LIDOCAINE 50 MG/G
OINTMENT TOPICAL ONCE
OUTPATIENT
Start: 2024-04-01 | End: 2024-04-01

## 2024-04-01 RX ORDER — GENTAMICIN SULFATE 1 MG/G
OINTMENT TOPICAL ONCE
OUTPATIENT
Start: 2024-04-01 | End: 2024-04-01

## 2024-04-01 RX ORDER — LIDOCAINE HYDROCHLORIDE 40 MG/ML
SOLUTION TOPICAL ONCE
OUTPATIENT
Start: 2024-04-01 | End: 2024-04-01

## 2024-04-01 RX ORDER — SODIUM CHLOR/HYPOCHLOROUS ACID 0.033 %
SOLUTION, IRRIGATION IRRIGATION ONCE
Status: COMPLETED | OUTPATIENT
Start: 2024-04-01 | End: 2024-04-01

## 2024-04-01 RX ORDER — BETAMETHASONE DIPROPIONATE 0.5 MG/G
CREAM TOPICAL ONCE
OUTPATIENT
Start: 2024-04-01 | End: 2024-04-01

## 2024-04-01 RX ORDER — BACITRACIN ZINC AND POLYMYXIN B SULFATE 500; 1000 [USP'U]/G; [USP'U]/G
OINTMENT TOPICAL ONCE
OUTPATIENT
Start: 2024-04-01 | End: 2024-04-01

## 2024-04-01 RX ORDER — GINSENG 100 MG
CAPSULE ORAL ONCE
OUTPATIENT
Start: 2024-04-01 | End: 2024-04-01

## 2024-04-01 RX ADMIN — Medication: at 13:55

## 2024-04-01 NOTE — DISCHARGE INSTRUCTIONS
Discharge Instructions from  [facility] Care Clinic at [facility]   [address]   [address]  [place] [address] [phone] Fax [phone]    NAME:  Shanna Edwards  YOB: 1950  MEDICAL RECORD NUMBER:  [id number]  DATE: 4/1/2024    Wound Cleansing:   Do not scrub or use excessive force.  Cleanse wound prior to applying a clean dressing with:  [] Normal Saline [] Keep Wound Dry in Shower    [] Wound Cleanser   [] Cleanse wound with Mild Soap & Water  [] May Shower at Discharge   [] Other:      Topical Treatments:  Do not apply lotions, creams, or ointments to wound bed unless directed.   [] Apply moisturizing lotion to skin surrounding the wound prior to dressing change.  [] Apply antifungal ointment to skin surrounding the wound prior to dressing change.  [] Apply thin film of moisture barrier ointment to skin immediately around wound.  [] Other:       Dressings:           Wound Location Right hand, forefinger   [x] Apply Primary Dressing:       [] MediHoney Gel [] Alginate with Silver [] Alginate   [] Collagen [] Collagen with Silver   [] Santyl with Moisten saline gauze     [] Hydrocolloid   [] MediHoney Alginate [] Foam with Silver   [] Foam   [] Hydrofera Blue    [] Mepilex Border    [] Moisten with Saline [] Hydrogel [] Mepitel     [x] Bactroban/Mupirocin [] Polysporin  [] Other:    [] Pack wound loosely with  [] Iodoform   [] Plain Packing  [] Other   [x] Cover and Secure with:     [] Gauze [] Perla [] Kerlix   [] Ace Wrap [] Cover Roll Tape [] ABD     [x] Other: Band aid   Avoid contact of tape with skin.  [x] Change dressing: [] Daily    [x] Every Other Day [] Three times per week   [] Once a week [] Do Not Change Dressing   [] Other:              Edema Control:  Apply: [x] Sigvaris Compression Garments [x]Right Leg [x]Left Leg   [] Tubigrip []Right Leg Double Layer []Left Leg Double Layer       []Right Leg Single Layer []Left Leg Single Layer   []

## 2024-04-01 NOTE — FLOWSHEET NOTE
04/01/24 1115   Wound 02/19/24 Finger (Comment which one) Right   Date First Assessed/Time First Assessed: 02/19/24 0948   Present on Original Admission: Yes  Primary Wound Type: Other (comment)  Location: Finger (Comment which one)  Wound Location Orientation: Right   Wound Image    Wound Etiology Other   Dressing Status New dressing applied   Wound Cleansed Vashe   Dressing/Treatment Antibacterial ointment;Silicone border   Offloading for Diabetic Foot Ulcers Offloading not required   Dressing Change Due 04/08/24   Wound Length (cm) 0.5 cm   Wound Width (cm) 0.5 cm   Wound Depth (cm) 0.2 cm   Wound Surface Area (cm^2) 0.25 cm^2   Change in Wound Size % (l*w) 0   Wound Volume (cm^3) 0.05 cm^3   Wound Healing % 0   Wound Assessment Pink/red   Drainage Amount Scant (moist but unmeasurable)   Drainage Description Serosanguinous   Odor None   Jaylin-wound Assessment Intact   Margins Defined edges   Wound Thickness Description not for Pressure Injury Full thickness   Wound 02/19/24 Pretibial Left;Anterior   Date First Assessed/Time First Assessed: 02/19/24 1047   Present on Original Admission: Yes  Primary Wound Type: Venous Ulcer  Location: Pretibial  Wound Location Orientation: Left;Anterior   Wound Image    Wound Etiology Venous   Dressing Status Intact   Wound Cleansed Cleansed with saline   Dressing/Treatment Other (comment)  (Velcro compression wraps applied)   Wound Length (cm)   (Legs clinically closed)   Wound Assessment Epithelialization   Drainage Amount None (dry)   Jaylin-wound Assessment Blanchable erythema   Wound 02/19/24 Pretibial Right;Dorsal   Date First Assessed/Time First Assessed: 02/19/24 1047   Present on Original Admission: Yes  Primary Wound Type: Venous Ulcer  Location: Pretibial  Wound Location Orientation: Right;Dorsal   Wound Image    Wound Etiology Venous   Dressing Status Intact   Wound Cleansed Cleansed with saline   Dressing/Treatment   (Velcro Compression wraps)   Offloading for

## 2024-04-08 ENCOUNTER — HOSPITAL ENCOUNTER (OUTPATIENT)
Facility: HOSPITAL | Age: 74
Discharge: HOME | End: 2024-04-08
Attending: FAMILY MEDICINE
Payer: MEDICARE

## 2024-04-08 DIAGNOSIS — L97.921 CHRONIC ULCER OF LEFT LEG, LIMITED TO BREAKDOWN OF SKIN (HCC): Primary | ICD-10-CM

## 2024-04-08 DIAGNOSIS — E11.621 DIABETIC ULCER OF TOE OF LEFT FOOT ASSOCIATED WITH TYPE 2 DIABETES MELLITUS, WITH FAT LAYER EXPOSED (HCC): ICD-10-CM

## 2024-04-08 DIAGNOSIS — L97.911 CHRONIC ULCER OF RIGHT LEG, LIMITED TO BREAKDOWN OF SKIN (HCC): ICD-10-CM

## 2024-04-08 DIAGNOSIS — S61.200A OPEN WOUND OF RIGHT INDEX FINGER: ICD-10-CM

## 2024-04-08 DIAGNOSIS — L97.522 DIABETIC ULCER OF TOE OF LEFT FOOT ASSOCIATED WITH TYPE 2 DIABETES MELLITUS, WITH FAT LAYER EXPOSED (HCC): ICD-10-CM

## 2024-04-08 PROCEDURE — 11106 INCAL BX SKN SINGLE LES: CPT

## 2024-04-08 PROCEDURE — 6370000000 HC RX 637 (ALT 250 FOR IP): Performed by: FAMILY MEDICINE

## 2024-04-08 PROCEDURE — 88305 TISSUE EXAM BY PATHOLOGIST: CPT

## 2024-04-08 RX ORDER — LIDOCAINE 40 MG/G
CREAM TOPICAL ONCE
OUTPATIENT
Start: 2024-04-08 | End: 2024-04-08

## 2024-04-08 RX ORDER — BETAMETHASONE DIPROPIONATE 0.5 MG/G
CREAM TOPICAL ONCE
OUTPATIENT
Start: 2024-04-08 | End: 2024-04-08

## 2024-04-08 RX ORDER — LIDOCAINE HYDROCHLORIDE 20 MG/ML
JELLY TOPICAL ONCE
OUTPATIENT
Start: 2024-04-08 | End: 2024-04-08

## 2024-04-08 RX ORDER — GENTAMICIN SULFATE 1 MG/G
OINTMENT TOPICAL ONCE
OUTPATIENT
Start: 2024-04-08 | End: 2024-04-08

## 2024-04-08 RX ORDER — LIDOCAINE HYDROCHLORIDE 20 MG/ML
JELLY TOPICAL ONCE
Status: COMPLETED | OUTPATIENT
Start: 2024-04-08 | End: 2024-04-08

## 2024-04-08 RX ORDER — TRIAMCINOLONE ACETONIDE 1 MG/G
OINTMENT TOPICAL ONCE
OUTPATIENT
Start: 2024-04-08 | End: 2024-04-08

## 2024-04-08 RX ORDER — CLOBETASOL PROPIONATE 0.5 MG/G
OINTMENT TOPICAL ONCE
OUTPATIENT
Start: 2024-04-08 | End: 2024-04-08

## 2024-04-08 RX ORDER — BACITRACIN ZINC AND POLYMYXIN B SULFATE 500; 1000 [USP'U]/G; [USP'U]/G
OINTMENT TOPICAL ONCE
OUTPATIENT
Start: 2024-04-08 | End: 2024-04-08

## 2024-04-08 RX ORDER — IBUPROFEN 200 MG
TABLET ORAL ONCE
OUTPATIENT
Start: 2024-04-08 | End: 2024-04-08

## 2024-04-08 RX ORDER — GINSENG 100 MG
CAPSULE ORAL ONCE
OUTPATIENT
Start: 2024-04-08 | End: 2024-04-08

## 2024-04-08 RX ORDER — LIDOCAINE HYDROCHLORIDE 40 MG/ML
SOLUTION TOPICAL ONCE
OUTPATIENT
Start: 2024-04-08 | End: 2024-04-08

## 2024-04-08 RX ORDER — SODIUM CHLOR/HYPOCHLOROUS ACID 0.033 %
SOLUTION, IRRIGATION IRRIGATION ONCE
OUTPATIENT
Start: 2024-04-08 | End: 2024-04-08

## 2024-04-08 RX ORDER — LIDOCAINE 50 MG/G
OINTMENT TOPICAL ONCE
OUTPATIENT
Start: 2024-04-08 | End: 2024-04-08

## 2024-04-08 RX ADMIN — LIDOCAINE HYDROCHLORIDE: 20 JELLY TOPICAL at 11:55

## 2024-04-08 NOTE — PROGRESS NOTES
Wound Center  Progress Note / Procedure Note      Chief Complaint:  Shanna Edwards is a 73 y.o.  female  with lower leg wound of >few months duration.        Assessment/Plan     73 y.o. female with     -R leg circumferential chronic ulcer.  Partial thickness  Looks good, mild reddened areas on feet but no weeping    -L leg circumferential chronic ulcer.  Partial thickness  Looks good, mold reddened areas on feet but no weeping    -L second toe diabetic ulcer  +pressure component  Great toenail digging into this area  Remains healed    -R Index finger chronic ulcer  Full thickness, irregular border, raised  Stable  Has had for 5 years  Not much improvement change  R/o BCC  Discussed biopsy today- agreed   Attempted biopsy, sample size possibly too small- will send to lab    -DM2  A1c 5.9  (10/2023)    -CKD  eGFR 31 (2/2024)    -Toenails long and curved  Causing wounds  Refer to podiatry- Dr Naylor- 4/19 appt    Following discussed with patient   Needs :  Serial debridement- prn    Good local wound care  Dressing:see nursing notes  Frequency : once weekly    Use velcro wraps  Patient/  understood and agrees with plan. Questions answered.      Follow up with me in 1 week    Subjective:   Since last visit  Not able to pull velcro liner on because of finger wound, hurts      HPI:     Had had wounds/weeping since September  Was too involved in taking care of spouse and neglected own health  Spouse has passed away  Pcp referred her here    Had denuded weeping raw legs bilaterally  Culture done    Alginate, 2 layer coflect    Left 2nd toe DFU  Great toeNail digging into second toe  Betadine wet to dry snaked through webspaces  Refer to Dr Naylor      History/Chart/Medications reviewed    Wound caused by:  venous, diabetic  Current wound care:See flowsheet  Offloading wound:   Elevating legs:yes  Compression compliance:yes  Appetite: fair  Wound associated pain: See flowsheet  Diabetic: yes  Smoker:

## 2024-04-15 ENCOUNTER — HOSPITAL ENCOUNTER (OUTPATIENT)
Facility: HOSPITAL | Age: 74
Discharge: HOME | End: 2024-04-15
Attending: FAMILY MEDICINE

## 2024-04-15 VITALS
DIASTOLIC BLOOD PRESSURE: 75 MMHG | OXYGEN SATURATION: 92 % | SYSTOLIC BLOOD PRESSURE: 120 MMHG | HEART RATE: 64 BPM | TEMPERATURE: 97.2 F | RESPIRATION RATE: 18 BRPM

## 2024-04-15 RX ORDER — LIDOCAINE HYDROCHLORIDE 40 MG/ML
SOLUTION TOPICAL ONCE
OUTPATIENT
Start: 2024-04-15 | End: 2024-04-15

## 2024-04-15 RX ORDER — GENTAMICIN SULFATE 1 MG/G
OINTMENT TOPICAL ONCE
OUTPATIENT
Start: 2024-04-15 | End: 2024-04-15

## 2024-04-15 RX ORDER — LIDOCAINE 50 MG/G
OINTMENT TOPICAL ONCE
OUTPATIENT
Start: 2024-04-15 | End: 2024-04-15

## 2024-04-15 RX ORDER — BACITRACIN ZINC AND POLYMYXIN B SULFATE 500; 1000 [USP'U]/G; [USP'U]/G
OINTMENT TOPICAL ONCE
OUTPATIENT
Start: 2024-04-15 | End: 2024-04-15

## 2024-04-15 RX ORDER — SODIUM CHLOR/HYPOCHLOROUS ACID 0.033 %
SOLUTION, IRRIGATION IRRIGATION ONCE
OUTPATIENT
Start: 2024-04-15 | End: 2024-04-15

## 2024-04-15 RX ORDER — BETAMETHASONE DIPROPIONATE 0.5 MG/G
CREAM TOPICAL ONCE
OUTPATIENT
Start: 2024-04-15 | End: 2024-04-15

## 2024-04-15 RX ORDER — CLOBETASOL PROPIONATE 0.5 MG/G
OINTMENT TOPICAL ONCE
OUTPATIENT
Start: 2024-04-15 | End: 2024-04-15

## 2024-04-15 RX ORDER — LIDOCAINE HYDROCHLORIDE 20 MG/ML
JELLY TOPICAL ONCE
OUTPATIENT
Start: 2024-04-15 | End: 2024-04-15

## 2024-04-15 RX ORDER — IBUPROFEN 200 MG
TABLET ORAL ONCE
OUTPATIENT
Start: 2024-04-15 | End: 2024-04-15

## 2024-04-15 RX ORDER — LIDOCAINE 40 MG/G
CREAM TOPICAL ONCE
OUTPATIENT
Start: 2024-04-15 | End: 2024-04-15

## 2024-04-15 RX ORDER — TRIAMCINOLONE ACETONIDE 1 MG/G
OINTMENT TOPICAL ONCE
OUTPATIENT
Start: 2024-04-15 | End: 2024-04-15

## 2024-04-15 RX ORDER — GINSENG 100 MG
CAPSULE ORAL ONCE
OUTPATIENT
Start: 2024-04-15 | End: 2024-04-15

## 2024-04-15 NOTE — PROGRESS NOTES
Wound Center  Progress Note / Procedure Note      Chief Complaint:  Shanna Edwards is a 74 y.o.  female  with lower leg wound of >few months duration.        Assessment/Plan     74 y.o. female with     -R leg circumferential chronic ulcer.  Partial thickness  Looks good, mild reddened areas on feet but no weeping    -L leg circumferential chronic ulcer.  Partial thickness  Some open areas, superficial    -L second toe diabetic ulcer  +pressure component  Great toenail digging into this area  Remains healed    -R Index finger chronic ulcer  Full thickness, irregular border, raised  Smaller, but some thick exudate expressed  Debrided and exudate removed  Cont with collagen dressing  Biopsy results reviewed      -DM2  A1c 5.9  (10/2023)    -CKD  eGFR 31 (2/2024)    -Toenails long and curved  Causing wounds  Refer to podiatry- Dr Naylor- 4/19 appt    Following discussed with patient   Needs :  Serial debridement- prn    Good local wound care  Dressing:see nursing notes  Frequency : once weekly    Use velcro wraps  Discussed having compression on at all times and to have dressing on at all times    Patient/  understood and agrees with plan. Questions answered.      Follow up with me in 1 week    Subjective:   Since last visit  Didn't have dressing on finger wound  No compression on legs      HPI:     Had had wounds/weeping since September  Was too involved in taking care of spouse and neglected own health  Spouse has passed away  Pcp referred her here    Had denuded weeping raw legs bilaterally  Culture done    Alginate, 2 layer coflect    Left 2nd toe DFU  Great toeNail digging into second toe  Betadine wet to dry snaked through webspaces  Refer to Dr Naylor      History/Chart/Medications reviewed    Wound caused by:  venous, diabetic  Current wound care:See flowsheet  Offloading wound:   Elevating legs:yes  Compression compliance:yes  Appetite: fair  Wound associated pain: See flowsheet  Diabetic:

## 2024-04-22 ENCOUNTER — HOSPITAL ENCOUNTER (OUTPATIENT)
Facility: HOSPITAL | Age: 74
Discharge: HOME | End: 2024-04-22
Attending: FAMILY MEDICINE
Payer: MEDICARE

## 2024-04-22 VITALS
SYSTOLIC BLOOD PRESSURE: 129 MMHG | TEMPERATURE: 97.4 F | DIASTOLIC BLOOD PRESSURE: 67 MMHG | RESPIRATION RATE: 18 BRPM | HEART RATE: 72 BPM

## 2024-04-22 PROCEDURE — 11042 DBRDMT SUBQ TIS 1ST 20SQCM/<: CPT

## 2024-04-22 NOTE — DISCHARGE INSTRUCTIONS
Discharge Instructions from  [place] [address] [phone] Fax [phone]    Do not remove dressing     Return Appointment:  [] Wound and dressing supply provider:   [] ECF or Home Healthcare:  [] Wound Assessment: [] Physician or NP scheduled for Wound Assessment:   [x] Return Appointment: With MD  in  1 Week(s)  [] Ordered tests:       Wound Care Center Information: Should you experience any significant changes in your wound(s) or have questions about your wound care, please contact the [facility] at MONDAY - FRIDAY 8:00 am - 4:30.  If you need help with your wound outside these hours and cannot wait until we are again available, contact your PCP or go to the hospital emergency room.     PLEASE NOTE: IF YOU ARE UNABLE TO OBTAIN WOUND SUPPLIES, CONTINUE TO USE THE SUPPLIES YOU HAVE AVAILABLE UNTIL YOU ARE ABLE TO REACH US. IT IS MOST IMPORTANT TO KEEP THE WOUND COVERED AT ALL TIMES.

## 2024-04-22 NOTE — FLOWSHEET NOTE
04/22/24 1042   Wound 02/19/24 Pretibial Left;Anterior   Date First Assessed/Time First Assessed: 02/19/24 1047   Present on Original Admission: Yes  Primary Wound Type: Venous Ulcer  Location: Pretibial  Wound Location Orientation: Left;Anterior   Wound Image     Wound Etiology Venous   Dressing Status Intact   Wound Cleansed Wound cleanser   Dressing/Treatment Alginate with Ag;Collagen  (2 layer)   Offloading for Diabetic Foot Ulcers Offloading not required   Dressing Change Due 04/29/24   Wound Length (cm) 0.7 cm   Wound Width (cm) 0.7 cm   Wound Depth (cm) 0.1 cm   Wound Surface Area (cm^2) 0.49 cm^2   Change in Wound Size % (l*w) 99.87   Wound Volume (cm^3) 0.049 cm^3   Wound Healing % 100   Post-Procedure Length (cm) 0.7 cm   Post-Procedure Width (cm) 0.7 cm   Post-Procedure Depth (cm) 0.2 cm   Post-Procedure Surface Area (cm^2) 0.49 cm^2   Post-Procedure Volume (cm^3) 0.098 cm^3   Wound Assessment Fibrin   Drainage Amount Small (< 25%)   Drainage Description Serous   Odor Moderate   Jaylin-wound Assessment Dry/flaky;Edematous;Blanchable erythema   Margins Attached edges   Wound Thickness Description not for Pressure Injury Full thickness   Wound 02/19/24 Finger (Comment which one) Right 2nd finger   Date First Assessed/Time First Assessed: 02/19/24 0948   Present on Original Admission: Yes  Primary Wound Type: Other (comment)  Location: Finger (Comment which one)  Wound Location Orientation: Right  Wound Description (Comments): 2nd finger   Wound Length (cm) 0.7 cm   Wound Width (cm) 0.7 cm   Wound Depth (cm) 0.1 cm   Wound Surface Area (cm^2) 0.49 cm^2   Change in Wound Size % (l*w) -96   Wound Volume (cm^3) 0.049 cm^3   Wound Healing % 2   Post-Procedure Length (cm) 0.7 cm   Post-Procedure Width (cm) 0.7 cm   Post-Procedure Depth (cm) 0.2 cm   Post-Procedure Surface Area (cm^2) 0.49 cm^2   Post-Procedure Volume (cm^3) 0.098 cm^3   Wound Assessment Pale granulation tissue   Drainage Amount Small (< 25%)

## 2024-04-22 NOTE — PROGRESS NOTES
Wound Center  Progress Note / Procedure Note      Chief Complaint:  Shanna Edwards is a 74 y.o.  female  with lower leg wound of >few months duration.        Assessment/Plan     74 y.o. female with     -R leg circumferential chronic ulcer.  Partial thickness  Looks good    -L leg circumferential chronic ulcer.  Full thickness  One new area, where velcro wrap dug in  All necrotic/slough  Debrided as below  collagen dressing    -R Index finger chronic ulcer  Full thickness, irregular border, raised  Filled in  Sl slough  Debrided   Cont with collagen dressing    -toe odor and mild moisture between toes  Use betadine mositened gauze snaked through toes      -DM2  A1c 5.9  (10/2023)    -CKD  eGFR 31 (2/2024)      Following discussed with patient   Needs :  Serial debridement- prn    Good local wound care  Dressing:see nursing notes  Frequency : once weekly    Use velcro wraps  Discussed having compression on at all times and to have dressing on at all times    Patient/  understood and agrees with plan. Questions answered.      Follow up with me in 1 week    Subjective:   Since last visit  Left velcro wrap on all week  Finger dressing stayed on all week  Toe nails are cut now      HPI:     Had had wounds/weeping since September  Was too involved in taking care of spouse and neglected own health  Spouse has passed away  Pcp referred her here    Had denuded weeping raw legs bilaterally  Culture done    Alginate, 2 layer coflect    Left 2nd toe DFU  Great toeNail digging into second toe  Betadine wet to dry snaked through webspaces  Refer to Dr Naylor      History/Chart/Medications reviewed    Wound caused by:  venous, diabetic  Current wound care:See flowsheet  Offloading wound:   Elevating legs:yes  Compression compliance:yes  Appetite: fair  Wound associated pain: See flowsheet  Diabetic: yes  Smoker: no  ROS: no N/V/D, no T/chills; no local rash, no chest pain +shortness of breath due to Afib, no

## 2024-04-29 ENCOUNTER — HOSPITAL ENCOUNTER (OUTPATIENT)
Facility: HOSPITAL | Age: 74
Discharge: HOME | End: 2024-04-29
Attending: FAMILY MEDICINE
Payer: MEDICARE

## 2024-04-29 VITALS
RESPIRATION RATE: 18 BRPM | SYSTOLIC BLOOD PRESSURE: 115 MMHG | TEMPERATURE: 97.9 F | DIASTOLIC BLOOD PRESSURE: 57 MMHG | OXYGEN SATURATION: 93 % | HEART RATE: 82 BPM

## 2024-04-29 PROCEDURE — 29581 APPL MULTLAYER CMPRN SYS LEG: CPT

## 2024-04-29 ASSESSMENT — PAIN SCALES - GENERAL: PAINLEVEL_OUTOF10: 0

## 2024-04-29 NOTE — FLOWSHEET NOTE
04/29/24 1445   Wound 02/19/24 Pretibial Left;Anterior   Date First Assessed/Time First Assessed: 02/19/24 1047   Present on Original Admission: Yes  Primary Wound Type: Venous Ulcer  Location: Pretibial  Wound Location Orientation: Left;Anterior   Wound Image    Wound Etiology Venous   Dressing Status Intact   Wound Cleansed Wound cleanser   Dressing/Treatment Alginate with Ag  (2 layer wrap)   Offloading for Diabetic Foot Ulcers Offloading not required   Dressing Change Due 05/06/24   Wound Length (cm) 0.4 cm   Wound Width (cm) 0.4 cm   Wound Depth (cm) 0.1 cm   Wound Surface Area (cm^2) 0.16 cm^2   Change in Wound Size % (l*w) 99.96   Wound Volume (cm^3) 0.016 cm^3   Wound Healing % 100   Wound Assessment Fibrin   Drainage Amount Small (< 25%)   Drainage Description Serous   Odor Mild   Jaylin-wound Assessment Dry/flaky   Margins Attached edges   Wound Thickness Description not for Pressure Injury Full thickness   Wound 02/19/24 Finger (Comment which one) Right 2nd finger   Date First Assessed/Time First Assessed: 02/19/24 0948   Present on Original Admission: Yes  Primary Wound Type: Other (comment)  Location: Finger (Comment which one)  Wound Location Orientation: Right  Wound Description (Comments): 2nd finger   Wound Image    Wound Etiology Other   Dressing Status Intact   Wound Cleansed Vashe   Dressing/Treatment Collagen with Ag;Gauze dressing/dressing sponge;Tape/Soft cloth adhesive tape   Offloading for Diabetic Foot Ulcers Offloading not required   Dressing Change Due 05/06/24   Wound Length (cm) 0.4 cm   Wound Width (cm) 0.4 cm   Wound Depth (cm) 0.1 cm   Wound Surface Area (cm^2) 0.16 cm^2   Change in Wound Size % (l*w) 36   Wound Volume (cm^3) 0.016 cm^3   Wound Healing % 68   Wound Assessment Pale granulation tissue   Drainage Amount Scant (moist but unmeasurable)   Drainage Description Serosanguinous   Odor None   Jaylin-wound Assessment Blanchable erythema;Maceration   Margins Defined edges   Wound

## 2024-04-29 NOTE — DISCHARGE INSTRUCTIONS
Compression Wraps Discharge Instructions   Location: Left leg  Type: 2 layer wrap    Your doctor has ordered compression therapy for your wound. Compression bandages reduce the swelling, or edema, in your legs and prevent it from returning. The wound care staff will apply your compression wrap. It must be removed and re-applied at least weekly. As the swelling decreases, the boot no longer provides adequate compression and you need a new one. Once applied, you need to know how to take care of your compression wrap.     The boot must stay dry. Do not get it wet in the shower or tub. You may do a partial bath, or you can cover the boot with a large plastic bag, secured at the top, so that no water can get in.    Avoid standing in one place for long periods of time. If you must  one place, shift your weight and change positions often.    If you have CHF, consult your doctor before following the next two recommendations for leg elevation.     When sitting, elevate your legs on pillows, or put blocks under the foot of your bed. Your legs should be higher than your heart.    If your boot becomes painful, or you notice an increase in swelling in your toes, numbness or tingling, or purple color to your toes, remove the wrap and call the Wound Care Center. If it is after hours, call your doctor for instructions or go to the nearest emergency room.

## 2024-05-06 ENCOUNTER — HOSPITAL ENCOUNTER (OUTPATIENT)
Facility: HOSPITAL | Age: 74
Discharge: HOME | End: 2024-05-06
Attending: FAMILY MEDICINE
Payer: MEDICARE

## 2024-05-06 VITALS
RESPIRATION RATE: 18 BRPM | HEART RATE: 63 BPM | DIASTOLIC BLOOD PRESSURE: 70 MMHG | SYSTOLIC BLOOD PRESSURE: 113 MMHG | OXYGEN SATURATION: 92 % | TEMPERATURE: 97.5 F

## 2024-05-06 DIAGNOSIS — S61.200A OPEN WOUND OF RIGHT INDEX FINGER: ICD-10-CM

## 2024-05-06 DIAGNOSIS — L97.921 CHRONIC ULCER OF LEFT LEG, LIMITED TO BREAKDOWN OF SKIN (HCC): Primary | ICD-10-CM

## 2024-05-06 DIAGNOSIS — E11.621 DIABETIC ULCER OF TOE OF LEFT FOOT ASSOCIATED WITH TYPE 2 DIABETES MELLITUS, WITH FAT LAYER EXPOSED (HCC): ICD-10-CM

## 2024-05-06 DIAGNOSIS — L97.522 DIABETIC ULCER OF TOE OF LEFT FOOT ASSOCIATED WITH TYPE 2 DIABETES MELLITUS, WITH FAT LAYER EXPOSED (HCC): ICD-10-CM

## 2024-05-06 DIAGNOSIS — L97.911 CHRONIC ULCER OF RIGHT LEG, LIMITED TO BREAKDOWN OF SKIN (HCC): ICD-10-CM

## 2024-05-06 PROCEDURE — 99213 OFFICE O/P EST LOW 20 MIN: CPT

## 2024-05-06 RX ORDER — IBUPROFEN 200 MG
TABLET ORAL ONCE
OUTPATIENT
Start: 2024-05-06 | End: 2024-05-06

## 2024-05-06 RX ORDER — LIDOCAINE 40 MG/G
CREAM TOPICAL ONCE
OUTPATIENT
Start: 2024-05-06 | End: 2024-05-06

## 2024-05-06 RX ORDER — TRIAMCINOLONE ACETONIDE 1 MG/G
OINTMENT TOPICAL ONCE
OUTPATIENT
Start: 2024-05-06 | End: 2024-05-06

## 2024-05-06 RX ORDER — BETAMETHASONE DIPROPIONATE 0.5 MG/G
CREAM TOPICAL ONCE
OUTPATIENT
Start: 2024-05-06 | End: 2024-05-06

## 2024-05-06 RX ORDER — LIDOCAINE HYDROCHLORIDE 20 MG/ML
JELLY TOPICAL ONCE
OUTPATIENT
Start: 2024-05-06 | End: 2024-05-06

## 2024-05-06 RX ORDER — GENTAMICIN SULFATE 1 MG/G
OINTMENT TOPICAL ONCE
OUTPATIENT
Start: 2024-05-06 | End: 2024-05-06

## 2024-05-06 RX ORDER — GINSENG 100 MG
CAPSULE ORAL ONCE
OUTPATIENT
Start: 2024-05-06 | End: 2024-05-06

## 2024-05-06 RX ORDER — CLOBETASOL PROPIONATE 0.5 MG/G
OINTMENT TOPICAL ONCE
OUTPATIENT
Start: 2024-05-06 | End: 2024-05-06

## 2024-05-06 RX ORDER — SODIUM CHLOR/HYPOCHLOROUS ACID 0.033 %
SOLUTION, IRRIGATION IRRIGATION ONCE
OUTPATIENT
Start: 2024-05-06 | End: 2024-05-06

## 2024-05-06 RX ORDER — BACITRACIN ZINC AND POLYMYXIN B SULFATE 500; 1000 [USP'U]/G; [USP'U]/G
OINTMENT TOPICAL ONCE
OUTPATIENT
Start: 2024-05-06 | End: 2024-05-06

## 2024-05-06 RX ORDER — LIDOCAINE 50 MG/G
OINTMENT TOPICAL ONCE
OUTPATIENT
Start: 2024-05-06 | End: 2024-05-06

## 2024-05-06 RX ORDER — LIDOCAINE HYDROCHLORIDE 40 MG/ML
SOLUTION TOPICAL ONCE
OUTPATIENT
Start: 2024-05-06 | End: 2024-05-06

## 2024-05-06 NOTE — PROGRESS NOTES
(NORVASC) 10 MG tablet Take 1 tablet by mouth daily 7/31/23 7/30/24  Alfonso Barroso MD   atorvastatin (LIPITOR) 20 MG tablet Take 1 tablet by mouth daily 6/14/18   Alfonso Barroso MD   Cyanocobalamin 2500 MCG TABS Take 3,000 mcg by mouth daily    Alfonso Barroso MD   DIGOX 125 MCG tablet Take 1 tablet by mouth daily 3/17/17   Alfonso Barroso MD   furosemide (LASIX) 40 MG tablet Take 1 tablet by mouth daily 10/11/23 10/10/24  Alfonso Barroso MD   blood glucose test strips (ONETOUCH ULTRA) strip  2/16/21   Alfonso Barroso MD   losartan (COZAAR) 50 MG tablet Take 1 tablet by mouth daily 5/10/23   Alfonso Barroso MD   magnesium oxide (MAG-OX) 400 (240 Mg) MG tablet Take 1 tablet by mouth 2 times daily 5/22/23   Alfonso Barroso MD   metFORMIN (GLUCOPHAGE) 1000 MG tablet Take 1 tablet by mouth daily 4/8/17   Alfonso Barroso MD   metoprolol succinate (TOPROL XL) 200 MG extended release tablet Take 1 tablet by mouth daily 1/29/24   Alfonso Barroso MD   metoprolol succinate (TOPROL XL) 200 MG extended release tablet Take 0.5 tablets by mouth 2 times daily 3/29/17   Alfonso Barroso MD   omega-3 acid ethyl esters (LOVAZA) 1 g capsule Take 2 capsules by mouth 2 times daily 4/4/17   Alfonso Barroso MD   tacrolimus (PROTOPIC) 0.1 % ointment Apply 1 each topically 2 times daily 12/16/22   Alfonso Barroso MD   warfarin (COUMADIN) 2.5 MG tablet Take 1 tablet by mouth daily 9/11/23   Alfonso Barroso MD      No Known Allergies       Signed By: Theodora Suarez MD      05/06/24

## 2024-05-10 NOTE — DISCHARGE INSTRUCTIONS
Discharge Instructions from  Wound Care Clinic at [facility]   [address]   [address]  [place] [address]  [phone] Fax [phone]    NAME:  Shanna Edwards  YOB: 1950  MEDICAL RECORD NUMBER:  [id number]  DATE: 5/6/2024    Wound Cleansing:   Do not scrub or use excessive force.  Cleanse wound prior to applying a clean dressing with:  [] Normal Saline [] Keep Wound Dry in Shower    [] Wound Cleanser   [] Cleanse wound with Mild Soap & Water  [] May Shower at Discharge   [] Other:      Topical Treatments:  Do not apply lotions, creams, or ointments to wound bed unless directed.   [] Apply moisturizing lotion to skin surrounding the wound prior to dressing change.  [] Apply antifungal ointment to skin surrounding the wound prior to dressing change.  [] Apply thin film of moisture barrier ointment to skin immediately around wound.  [] Other:       Dressings:           Wound Location Bilateral Legs   [] Apply Primary Dressing:       [] MediHoney Gel [] Alginate with Silver [] Alginate   [] Collagen [] Collagen with Silver   [] Santyl with Moisten saline gauze     [] Hydrocolloid   [] MediHoney Alginate [] Foam with Silver   [] Foam   [] Hydrofera Blue    [] Mepilex Border    [] Moisten with Saline [] Hydrogel [] Mepitel     [] Bactroban/Mupirocin [] Polysporin  [] Other:    [] Pack wound loosely with  [] Iodoform   [] Plain Packing  [] Other   [] Cover and Secure with:     [] Gauze [] Perla [] Kerlix   [] Ace Wrap [] Cover Roll Tape [] ABD     [] Other:    Avoid contact of tape with skin.  [] Change dressing: [] Daily    [] Every Other Day [] Three times per week   [] Once a week [] Do Not Change Dressing   [] Other:               Edema Control:  Apply: [x] Velcro Wraps [x]Right Leg [x]Left Leg   [x] Tubigrip [x]Right Leg Double Layer [x]Left Leg Double Layer       []Right Leg Single Layer []Left Leg Single Layer   [] SpandaGrip []Right Leg  []Left Leg      []Low

## 2024-05-10 NOTE — FLOWSHEET NOTE
05/06/24 1343   Wound 02/19/24 Pretibial Left;Anterior   Date First Assessed/Time First Assessed: 02/19/24 1047   Present on Original Admission: Yes  Primary Wound Type: Venous Ulcer  Location: Pretibial  Wound Location Orientation: Left;Anterior   Wound Image    Wound Etiology Venous   Dressing Status Intact   Wound Cleansed Wound cleanser   Dressing/Treatment Alginate with Ag  (tubigrips and velcro wraps)   Offloading for Diabetic Foot Ulcers Offloading not required   Dressing Change Due 05/13/24   Wound Length (cm)   (area is clinically closed)   Jaylin-wound Assessment Dry/flaky   Wound 02/19/24 Pretibial Right;Dorsal   Date First Assessed/Time First Assessed: 02/19/24 1047   Present on Original Admission: Yes  Primary Wound Type: Venous Ulcer  Location: Pretibial  Wound Location Orientation: Right;Dorsal   Wound Assessment Epithelialization   Drainage Amount None (dry)   Odor None   Jaylin-wound Assessment Dry/flaky   Wound 02/19/24 Finger (Comment which one) Right 2nd finger   Date First Assessed/Time First Assessed: 02/19/24 0948   Present on Original Admission: Yes  Primary Wound Type: Other (comment)  Location: Finger (Comment which one)  Wound Location Orientation: Right  Wound Description (Comments): 2nd finger   Wound Image    Wound Etiology Other   Dressing Status Intact   Wound Cleansed Vashe   Dressing/Treatment   (Band aid)   Offloading for Diabetic Foot Ulcers Offloading not required   Dressing Change Due 05/13/24   Wound Length (cm) 0.3 cm   Wound Width (cm) 0.3 cm   Wound Depth (cm) 0.1 cm   Wound Surface Area (cm^2) 0.09 cm^2   Change in Wound Size % (l*w) 64   Wound Volume (cm^3) 0.009 cm^3   Wound Healing % 82   Wound Assessment Epithelialization   Drainage Amount Scant (moist but unmeasurable)   Drainage Description Serosanguinous   Odor None   Jaylin-wound Assessment Blanchable erythema   Margins Defined edges   Wound Thickness Description not for Pressure Injury Full thickness

## 2024-05-13 ENCOUNTER — HOSPITAL ENCOUNTER (OUTPATIENT)
Facility: HOSPITAL | Age: 74
Discharge: HOME | End: 2024-05-13
Attending: FAMILY MEDICINE
Payer: MEDICARE

## 2024-05-13 VITALS
HEART RATE: 66 BPM | TEMPERATURE: 97.6 F | SYSTOLIC BLOOD PRESSURE: 116 MMHG | RESPIRATION RATE: 18 BRPM | OXYGEN SATURATION: 95 % | DIASTOLIC BLOOD PRESSURE: 62 MMHG

## 2024-05-13 DIAGNOSIS — L97.921 CHRONIC ULCER OF LEFT LEG, LIMITED TO BREAKDOWN OF SKIN (HCC): Primary | ICD-10-CM

## 2024-05-13 DIAGNOSIS — L97.911 CHRONIC ULCER OF RIGHT LEG, LIMITED TO BREAKDOWN OF SKIN (HCC): ICD-10-CM

## 2024-05-13 DIAGNOSIS — L97.522 DIABETIC ULCER OF TOE OF LEFT FOOT ASSOCIATED WITH TYPE 2 DIABETES MELLITUS, WITH FAT LAYER EXPOSED (HCC): ICD-10-CM

## 2024-05-13 DIAGNOSIS — E11.621 DIABETIC ULCER OF TOE OF LEFT FOOT ASSOCIATED WITH TYPE 2 DIABETES MELLITUS, WITH FAT LAYER EXPOSED (HCC): ICD-10-CM

## 2024-05-13 DIAGNOSIS — S61.200A OPEN WOUND OF RIGHT INDEX FINGER: ICD-10-CM

## 2024-05-13 PROCEDURE — 99213 OFFICE O/P EST LOW 20 MIN: CPT

## 2024-05-13 RX ORDER — IBUPROFEN 200 MG
TABLET ORAL ONCE
OUTPATIENT
Start: 2024-05-13 | End: 2024-05-13

## 2024-05-13 RX ORDER — LIDOCAINE HYDROCHLORIDE 40 MG/ML
SOLUTION TOPICAL ONCE
OUTPATIENT
Start: 2024-05-13 | End: 2024-05-13

## 2024-05-13 RX ORDER — LIDOCAINE HYDROCHLORIDE 20 MG/ML
JELLY TOPICAL ONCE
OUTPATIENT
Start: 2024-05-13 | End: 2024-05-13

## 2024-05-13 RX ORDER — BACITRACIN ZINC AND POLYMYXIN B SULFATE 500; 1000 [USP'U]/G; [USP'U]/G
OINTMENT TOPICAL ONCE
OUTPATIENT
Start: 2024-05-13 | End: 2024-05-13

## 2024-05-13 RX ORDER — CLOBETASOL PROPIONATE 0.5 MG/G
OINTMENT TOPICAL ONCE
OUTPATIENT
Start: 2024-05-13 | End: 2024-05-13

## 2024-05-13 RX ORDER — BETAMETHASONE DIPROPIONATE 0.5 MG/G
CREAM TOPICAL ONCE
OUTPATIENT
Start: 2024-05-13 | End: 2024-05-13

## 2024-05-13 RX ORDER — LIDOCAINE 40 MG/G
CREAM TOPICAL ONCE
OUTPATIENT
Start: 2024-05-13 | End: 2024-05-13

## 2024-05-13 RX ORDER — GINSENG 100 MG
CAPSULE ORAL ONCE
OUTPATIENT
Start: 2024-05-13 | End: 2024-05-13

## 2024-05-13 RX ORDER — TRIAMCINOLONE ACETONIDE 1 MG/G
OINTMENT TOPICAL ONCE
OUTPATIENT
Start: 2024-05-13 | End: 2024-05-13

## 2024-05-13 RX ORDER — SODIUM CHLOR/HYPOCHLOROUS ACID 0.033 %
SOLUTION, IRRIGATION IRRIGATION ONCE
OUTPATIENT
Start: 2024-05-13 | End: 2024-05-13

## 2024-05-13 RX ORDER — LIDOCAINE 50 MG/G
OINTMENT TOPICAL ONCE
OUTPATIENT
Start: 2024-05-13 | End: 2024-05-13

## 2024-05-13 RX ORDER — GENTAMICIN SULFATE 1 MG/G
OINTMENT TOPICAL ONCE
OUTPATIENT
Start: 2024-05-13 | End: 2024-05-13

## 2024-05-13 NOTE — DISCHARGE INSTRUCTIONS
Discharge Instructions from  Wound Care Clinic at [facility]   [address]   [address]  [place] [address]  [phone] Fax [phone]    NAME:  Shanna Edwards  YOB: 1950  MEDICAL RECORD NUMBER:  [id number]  DATE:  5/13/2024      Wound Cleansing:   Do not scrub or use excessive force.  Cleanse wound prior to applying a clean dressing with:  [] Normal Saline [] Keep Wound Dry in Shower    [] Wound Cleanser   [] Cleanse wound with Mild Soap & Water  [] May Shower at Discharge   [] Other:      Topical Treatments:  Do not apply lotions, creams, or ointments to wound bed unless directed.   [] Apply moisturizing lotion to skin surrounding the wound prior to dressing change.  [] Apply antifungal ointment to skin surrounding the wound prior to dressing change.  [] Apply thin film of moisture barrier ointment to skin immediately around wound.  [] Other:       Dressings:           Wound Location Bilateral Legs   [] Apply Primary Dressing:       [] MediHoney Gel [] Alginate with Silver [] Alginate   [] Collagen [] Collagen with Silver   [] Santyl with Moisten saline gauze     [] Hydrocolloid   [] MediHoney Alginate [] Foam with Silver   [] Foam   [] Hydrofera Blue    [] Mepilex Border    [] Moisten with Saline [] Hydrogel [] Mepitel     [] Bactroban/Mupirocin [] Polysporin  [] Other:    [] Pack wound loosely with  [] Iodoform   [] Plain Packing  [] Other   [] Cover and Secure with:     [] Gauze [] Perla [] Kerlix   [] Ace Wrap [] Cover Roll Tape [] ABD     [] Other:    Avoid contact of tape with skin.  [] Change dressing: [] Daily    [] Every Other Day [] Three times per week   [] Once a week [] Do Not Change Dressing   [] Other:               Edema Control:  Apply: [x] Velcro Wraps [x]Right Leg [x]Left Leg   [x] Tubigrip [x]Right Leg Double Layer [x]Left Leg Double Layer       []Right Leg Single Layer []Left Leg Single Layer   [] SpandaGrip []Right Leg  []Left Leg      []Low

## 2024-05-13 NOTE — FLOWSHEET NOTE
05/13/24 1015   Wound 02/19/24 Pretibial Left;Anterior   Date First Assessed/Time First Assessed: 02/19/24 1047   Present on Original Admission: Yes  Primary Wound Type: Venous Ulcer  Location: Pretibial  Wound Location Orientation: Left;Anterior   Wound Image     Wound Etiology Venous   Dressing Status Intact   Wound Cleansed Wound cleanser   Dressing/Treatment Betadine swabs/povidone iodine  (Velcro Compression wraps)   Offloading for Diabetic Foot Ulcers Offloading not required   Dressing Change Due 05/20/24   Wound Length (cm)   (areas are clinically closed)   Jaylin-wound Assessment Dry/flaky   Wound 02/19/24 Pretibial Right;Dorsal   Date First Assessed/Time First Assessed: 02/19/24 1047   Present on Original Admission: Yes  Primary Wound Type: Venous Ulcer  Location: Pretibial  Wound Location Orientation: Right;Dorsal   Wound Image     Wound Etiology Venous   Dressing Status Intact   Wound Cleansed Wound cleanser   Dressing/Treatment Betadine swabs/povidone iodine  (Velcro Compression wrap)   Offloading for Diabetic Foot Ulcers Offloading not required   Dressing Change Due 05/20/24   Wound Length (cm)   (areas clinically closed)   Wound Assessment Epithelialization   Drainage Amount None (dry)   Odor None   Jaylin-wound Assessment Dry/flaky   Margins Attached edges   Wound 02/19/24 Finger (Comment which one) Right 2nd finger   Date First Assessed/Time First Assessed: 02/19/24 0948   Present on Original Admission: Yes  Primary Wound Type: Other (comment)  Location: Finger (Comment which one)  Wound Location Orientation: Right  Wound Description (Comments): 2nd finger   Wound Image    Wound Etiology Other   Dressing Status Intact  (pt applying band aids)   Dressing/Treatment   (band aid)   Offloading for Diabetic Foot Ulcers Offloading not required   Dressing Change Due 05/20/24   Wound Assessment Epithelialization   Drainage Amount None (dry)   Odor None   Margins Attached edges

## 2024-05-20 ENCOUNTER — HOSPITAL ENCOUNTER (OUTPATIENT)
Facility: HOSPITAL | Age: 74
Discharge: HOME | End: 2024-05-20
Attending: FAMILY MEDICINE
Payer: MEDICARE

## 2024-05-20 VITALS
TEMPERATURE: 98.6 F | SYSTOLIC BLOOD PRESSURE: 114 MMHG | HEART RATE: 68 BPM | OXYGEN SATURATION: 96 % | DIASTOLIC BLOOD PRESSURE: 59 MMHG | RESPIRATION RATE: 16 BRPM

## 2024-05-20 DIAGNOSIS — L97.911 CHRONIC ULCER OF RIGHT LEG, LIMITED TO BREAKDOWN OF SKIN (HCC): ICD-10-CM

## 2024-05-20 DIAGNOSIS — S61.200A OPEN WOUND OF RIGHT INDEX FINGER: ICD-10-CM

## 2024-05-20 DIAGNOSIS — L97.921 CHRONIC ULCER OF LEFT LEG, LIMITED TO BREAKDOWN OF SKIN (HCC): Primary | ICD-10-CM

## 2024-05-20 DIAGNOSIS — E11.621 DIABETIC ULCER OF TOE OF LEFT FOOT ASSOCIATED WITH TYPE 2 DIABETES MELLITUS, WITH FAT LAYER EXPOSED (HCC): ICD-10-CM

## 2024-05-20 DIAGNOSIS — L97.522 DIABETIC ULCER OF TOE OF LEFT FOOT ASSOCIATED WITH TYPE 2 DIABETES MELLITUS, WITH FAT LAYER EXPOSED (HCC): ICD-10-CM

## 2024-05-20 PROCEDURE — 99213 OFFICE O/P EST LOW 20 MIN: CPT

## 2024-05-20 RX ORDER — BETAMETHASONE DIPROPIONATE 0.5 MG/G
CREAM TOPICAL ONCE
OUTPATIENT
Start: 2024-05-20 | End: 2024-05-20

## 2024-05-20 RX ORDER — LIDOCAINE 50 MG/G
OINTMENT TOPICAL ONCE
OUTPATIENT
Start: 2024-05-20 | End: 2024-05-20

## 2024-05-20 RX ORDER — CLOBETASOL PROPIONATE 0.5 MG/G
OINTMENT TOPICAL ONCE
OUTPATIENT
Start: 2024-05-20 | End: 2024-05-20

## 2024-05-20 RX ORDER — LIDOCAINE HYDROCHLORIDE 20 MG/ML
JELLY TOPICAL ONCE
OUTPATIENT
Start: 2024-05-20 | End: 2024-05-20

## 2024-05-20 RX ORDER — LIDOCAINE 40 MG/G
CREAM TOPICAL ONCE
OUTPATIENT
Start: 2024-05-20 | End: 2024-05-20

## 2024-05-20 RX ORDER — SODIUM CHLOR/HYPOCHLOROUS ACID 0.033 %
SOLUTION, IRRIGATION IRRIGATION ONCE
OUTPATIENT
Start: 2024-05-20 | End: 2024-05-20

## 2024-05-20 RX ORDER — GENTAMICIN SULFATE 1 MG/G
OINTMENT TOPICAL ONCE
OUTPATIENT
Start: 2024-05-20 | End: 2024-05-20

## 2024-05-20 RX ORDER — BACITRACIN ZINC AND POLYMYXIN B SULFATE 500; 1000 [USP'U]/G; [USP'U]/G
OINTMENT TOPICAL ONCE
OUTPATIENT
Start: 2024-05-20 | End: 2024-05-20

## 2024-05-20 RX ORDER — GINSENG 100 MG
CAPSULE ORAL ONCE
OUTPATIENT
Start: 2024-05-20 | End: 2024-05-20

## 2024-05-20 RX ORDER — IBUPROFEN 200 MG
TABLET ORAL ONCE
OUTPATIENT
Start: 2024-05-20 | End: 2024-05-20

## 2024-05-20 RX ORDER — TRIAMCINOLONE ACETONIDE 1 MG/G
OINTMENT TOPICAL ONCE
OUTPATIENT
Start: 2024-05-20 | End: 2024-05-20

## 2024-05-20 RX ORDER — LIDOCAINE HYDROCHLORIDE 40 MG/ML
SOLUTION TOPICAL ONCE
OUTPATIENT
Start: 2024-05-20 | End: 2024-05-20

## 2024-05-20 NOTE — PROGRESS NOTES
Offloading for Diabetic Foot Ulcers Offloading not required 05/13/24 1015   Dressing Change Due 05/20/24 05/13/24 1015   Wound Length (cm) 0.3 cm 05/06/24 1343   Wound Width (cm) 0.3 cm 05/06/24 1343   Wound Depth (cm) 0.1 cm 05/06/24 1343   Wound Surface Area (cm^2) 0.09 cm^2 05/06/24 1343   Change in Wound Size % (l*w) 64 05/06/24 1343   Wound Volume (cm^3) 0.009 cm^3 05/06/24 1343   Wound Healing % 82 05/06/24 1343   Post-Procedure Length (cm) 0.7 cm 04/22/24 1042   Post-Procedure Width (cm) 0.7 cm 04/22/24 1042   Post-Procedure Depth (cm) 0.2 cm 04/22/24 1042   Post-Procedure Surface Area (cm^2) 0.49 cm^2 04/22/24 1042   Post-Procedure Volume (cm^3) 0.098 cm^3 04/22/24 1042   Wound Assessment Epithelialization 05/13/24 1015   Drainage Amount None (dry) 05/13/24 1015   Drainage Description Serosanguinous 05/06/24 1343   Odor None 05/13/24 1015   Jaylin-wound Assessment Blanchable erythema 05/06/24 1343   Margins Attached edges 05/13/24 1015   Wound Thickness Description not for Pressure Injury Full thickness 05/06/24 1343   Number of days: 91          -------         -------    Past Medical History:   Diagnosis Date    Atrial fibrillation (HCC)     Diabetes mellitus (HCC)     Hyperlipidemia     Hypertension      Past Surgical History:   Procedure Laterality Date    HYSTERECTOMY (CERVIX STATUS UNKNOWN)       No family history on file.  Social History     Socioeconomic History    Marital status:    Tobacco Use    Smoking status: Never    Smokeless tobacco: Never   Substance and Sexual Activity    Alcohol use: Not Currently    Drug use: Never    Sexual activity: Not Currently        Prior to Admission medications    Medication Sig Start Date End Date Taking? Authorizing Provider   cholecalciferol (VITAMIN D-3) 10 MCG/ML (400 unit/mL) LIQD oral liquid Take 12.5 mLs by mouth daily    ProviderAlfonso MD   amLODIPine (NORVASC) 10 MG tablet Take 1 tablet by mouth daily 7/31/23 7/30/24  Provider, Historical,

## 2024-06-03 ENCOUNTER — HOSPITAL ENCOUNTER (OUTPATIENT)
Facility: HOSPITAL | Age: 74
Discharge: HOME | End: 2024-06-03
Attending: FAMILY MEDICINE
Payer: MEDICARE

## 2024-06-03 VITALS
TEMPERATURE: 97.6 F | HEART RATE: 72 BPM | OXYGEN SATURATION: 98 % | DIASTOLIC BLOOD PRESSURE: 63 MMHG | SYSTOLIC BLOOD PRESSURE: 126 MMHG | RESPIRATION RATE: 18 BRPM

## 2024-06-03 DIAGNOSIS — L97.921 CHRONIC ULCER OF LEFT LEG, LIMITED TO BREAKDOWN OF SKIN (HCC): Primary | ICD-10-CM

## 2024-06-03 DIAGNOSIS — L97.911 CHRONIC ULCER OF RIGHT LEG, LIMITED TO BREAKDOWN OF SKIN (HCC): ICD-10-CM

## 2024-06-03 DIAGNOSIS — S61.200A OPEN WOUND OF RIGHT INDEX FINGER: ICD-10-CM

## 2024-06-03 DIAGNOSIS — E11.621 DIABETIC ULCER OF TOE OF LEFT FOOT ASSOCIATED WITH TYPE 2 DIABETES MELLITUS, WITH FAT LAYER EXPOSED (HCC): ICD-10-CM

## 2024-06-03 DIAGNOSIS — L97.522 DIABETIC ULCER OF TOE OF LEFT FOOT ASSOCIATED WITH TYPE 2 DIABETES MELLITUS, WITH FAT LAYER EXPOSED (HCC): ICD-10-CM

## 2024-06-03 PROCEDURE — 99212 OFFICE O/P EST SF 10 MIN: CPT

## 2024-06-03 RX ORDER — LIDOCAINE HYDROCHLORIDE 40 MG/ML
SOLUTION TOPICAL ONCE
Status: CANCELLED | OUTPATIENT
Start: 2024-06-03 | End: 2024-06-03

## 2024-06-03 RX ORDER — LIDOCAINE 50 MG/G
OINTMENT TOPICAL ONCE
Status: CANCELLED | OUTPATIENT
Start: 2024-06-03 | End: 2024-06-03

## 2024-06-03 RX ORDER — SODIUM CHLOR/HYPOCHLOROUS ACID 0.033 %
SOLUTION, IRRIGATION IRRIGATION ONCE
Status: CANCELLED | OUTPATIENT
Start: 2024-06-03 | End: 2024-06-03

## 2024-06-03 RX ORDER — LIDOCAINE HYDROCHLORIDE 20 MG/ML
JELLY TOPICAL ONCE
Status: CANCELLED | OUTPATIENT
Start: 2024-06-03 | End: 2024-06-03

## 2024-06-03 RX ORDER — TRIAMCINOLONE ACETONIDE 1 MG/G
OINTMENT TOPICAL ONCE
Status: CANCELLED | OUTPATIENT
Start: 2024-06-03 | End: 2024-06-03

## 2024-06-03 RX ORDER — BACITRACIN ZINC AND POLYMYXIN B SULFATE 500; 1000 [USP'U]/G; [USP'U]/G
OINTMENT TOPICAL ONCE
Status: CANCELLED | OUTPATIENT
Start: 2024-06-03 | End: 2024-06-03

## 2024-06-03 RX ORDER — GENTAMICIN SULFATE 1 MG/G
OINTMENT TOPICAL ONCE
Status: CANCELLED | OUTPATIENT
Start: 2024-06-03 | End: 2024-06-03

## 2024-06-03 RX ORDER — BETAMETHASONE DIPROPIONATE 0.5 MG/G
CREAM TOPICAL ONCE
Status: CANCELLED | OUTPATIENT
Start: 2024-06-03 | End: 2024-06-03

## 2024-06-03 RX ORDER — CLOBETASOL PROPIONATE 0.5 MG/G
OINTMENT TOPICAL ONCE
Status: CANCELLED | OUTPATIENT
Start: 2024-06-03 | End: 2024-06-03

## 2024-06-03 RX ORDER — GINSENG 100 MG
CAPSULE ORAL ONCE
Status: CANCELLED | OUTPATIENT
Start: 2024-06-03 | End: 2024-06-03

## 2024-06-03 RX ORDER — LIDOCAINE 40 MG/G
CREAM TOPICAL ONCE
Status: CANCELLED | OUTPATIENT
Start: 2024-06-03 | End: 2024-06-03

## 2024-06-03 RX ORDER — IBUPROFEN 200 MG
TABLET ORAL ONCE
Status: CANCELLED | OUTPATIENT
Start: 2024-06-03 | End: 2024-06-03

## 2024-06-03 ASSESSMENT — PAIN SCALES - GENERAL: PAINLEVEL_OUTOF10: 0

## 2024-06-03 NOTE — DISCHARGE INSTRUCTIONS
Wound Care Discharge Instructions  Wound Care Clinic at [facility]                                     [address]             [address]               [place] [address]                                         Telephone: [phone]      FAX [phone]    NAME:  Shanna Edwards  YOB: 1950  MEDICAL RECORD NUMBER:  [id number]  DATE: 6/3/2024    Congratulations!  You have completed your treatment.     Return to your Primary Care Physician for all your health issues.   Resume your ordinary activities as tolerated.   Take your medications as prescribed by your primary care physician.   Check your skin daily for cracks, bruises, sores, or dryness. Use a moisturizer as needed.   Clean and dry your skin, using mild soap and warm water (not hot).   Maintain a nutritious diet.  Avoid pressure on your wound site. Keep your legs elevated above the level of the heart whenever possible.  Continue to use wraps/stockings/compression as prescribed.  Replace compression stockings every four to six months as needed to ensure proper fit.   Wear well-fitting shoes and leg garments.    THANK YOU FOR ALLOWING US TO SERVE YOU.  PLEASE CALL IF YOU DEVELOP ANOTHER WOUND. [phone])     Electronically signed by MARINO DICKERSON RN on 6/3/2024 at 3:57 PM

## 2024-06-03 NOTE — FLOWSHEET NOTE
06/03/24 1511   Wound 02/19/24 Pretibial Left;Anterior   Date First Assessed/Time First Assessed: 02/19/24 1047   Present on Original Admission: Yes  Primary Wound Type: Venous Ulcer  Location: Pretibial  Wound Location Orientation: Left;Anterior   Wound Image    Wound Etiology Venous   Dressing Status Intact   Wound Cleansed Soap and water   Dressing/Treatment Moisturizing cream  (velcro wraps)   Offloading for Diabetic Foot Ulcers Offloading not required   Dressing Change Due   (Pt discharged)   Wound Length (cm)   (Wounds closed)   Wound Assessment Epithelialization   Drainage Amount None (dry)   Jaylin-wound Assessment Dry/flaky   Wound 02/19/24 Pretibial Right;Dorsal   Date First Assessed/Time First Assessed: 02/19/24 1047   Present on Original Admission: Yes  Primary Wound Type: Venous Ulcer  Location: Pretibial  Wound Location Orientation: Right;Dorsal   Wound Image     Wound Etiology Venous   Dressing Status Intact   Wound Cleansed Soap and water   Dressing/Treatment   (velcro compression wraps)   Offloading for Diabetic Foot Ulcers Offloading not required   Dressing Change Due   (pt discharged)   Wound Length (cm)   (wounds are closed)   Wound Assessment Epithelialization   Drainage Amount None (dry)   Jaylin-wound Assessment Dry/flaky     Pt discharged from wound clinic

## 2024-06-03 NOTE — PROGRESS NOTES
Healed  Just very dry skin  Prevention discussed  213        Wound Center  Progress Note / Procedure Note      Chief Complaint:  Shanna Edwards is a 74 y.o.  female  with lower leg wound of >few months duration.        Assessment/Plan     74 y.o. female with     -R leg circumferential chronic ulcer.  Partial thickness  Improved, no open areas, no weeping    -L leg circumferential chronic ulcer.  Improved, no open areas, no weeping      -R Index finger chronic ulcer  Full thickness, irregular border, raised  About the same  Patient prefers to manage this own- has had for 5 years  Seen by Derm, now going to Ortho        -DM2  A1c 5.9  (10/2023)    -CKD  eGFR 31 (2/2024)      Following discussed with patient   Needs :  Serial debridement- prn    Good local wound care  Dressing:  Legs Mositurized.. Tubis- instructed to remove every night and put on every morning  Frequency : daily      Patient/  understood and agrees with plan. Questions answered.   Patient taught how to put on tubis, detailed instructions given     Next visit will give instructions and teach how to use velcro      Follow up with me in 1 week    Subjective:   Since last visit  Still Feeling overwhelmed with using compression        HPI:     Had had wounds/weeping since September  Was too involved in taking care of spouse and neglected own health  Spouse has passed away  Pcp referred her here    Had denuded weeping raw legs bilaterally  Culture done    Alginate, 2 layer coflect    Left 2nd toe DFU  Great toeNail digging into second toe  Betadine wet to dry snaked through webspaces  Refer to Dr Naylor      History/Chart/Medications reviewed    Wound caused by:  venous, diabetic  Current wound care:See flowsheet  Offloading wound:   Elevating legs:yes  Compression compliance:yes  Appetite: fair  Wound associated pain: See flowsheet  Diabetic: yes  Smoker: no  ROS: no N/V/D, no T/chills; no local rash, no chest pain +shortness of breath due 
normal...

## 2024-07-22 ENCOUNTER — HOSPITAL ENCOUNTER (OUTPATIENT)
Facility: HOSPITAL | Age: 74
Setting detail: RECURRING SERIES
Discharge: HOME | End: 2024-07-25
Payer: MEDICARE

## 2024-07-22 PROCEDURE — 97535 SELF CARE MNGMENT TRAINING: CPT

## 2024-07-22 PROCEDURE — 97162 PT EVAL MOD COMPLEX 30 MIN: CPT

## 2024-07-22 PROCEDURE — 97530 THERAPEUTIC ACTIVITIES: CPT

## 2024-07-22 NOTE — PROGRESS NOTES
[facility] at [facility]  [address] [place] [address]  Ph [phone]  Fx [phone]    Plan of Care/ Statement of Necessity for Physical Therapy Services      Patient name: Shanna Edwards Start of Care: 2024   Referral source: Estephanie Burns* : 1950    Medical Diagnosis: Lymphedema, not elsewhere classified [I89.0]   Onset Date:23    Treatment Diagnosis: I89.0 Lymphedema, not elsewhere classified     Prior Hospitalization: see medical history Provider#: [id number]   Medications: Verified on Patient summary List     Comorbidities: Diabetes mellitus, Musculoskeletal disorders, and Social determinants of health: Physical activity, A fib, heart murmer( Dr bAdi), Htn, Kidney disease (Dr. Nichols), Liver disease, Infection in right finger, weight loss     Prior Level of Function: functionally independent, no AD, sedentary lifestyle       The Plan of Care and following information is based on the information from the initial evaluation.  Assessment/ key information: 73 yo female who [facility] to [facility] with c/o april leg lymphedema. Pt reported that her swelling started before November/December last year.  Pt was unaware of exact time frame due to her taking care of her ill .  She reported that her cardiologist noticed it at her appt in December. Pt was cleared by cardio and pt reported that her PCP had referred to vascular. Pt reported that she did not have a doppler done at that time but was referred  to lymphedema therapy.  Pt reported that she went to wound care until the end of last month prior to vascular appt.  At that time vascular placed a 3 layer coban system to april LE and pt was unable to remove.  PT removed today and donned pts new compression garments. Patient reports Difficulty with wearing shoes. Patient demonstrates decreased strength, impaired fluid management, impaired posture, pain and decreased functional mobility tolerance.  During 
noted to april LE heavy on toes heels and dorsum of foot to knees.  Fibrotic tissue noted into thighs.    Gait: WFL    ROM:         WFL     Strength (MMT): to be tested at visit 2           Circumference measurements Left (cm) Right (cm)   Forefoot  22 22.2   Figure 8 29.5 29.5   Malleoli  23.5 23.5   10 cm proximal to malleoli 24.5 20   20 cm proximal to malleoli 33.5 30.5   10 cm distal to knee 39 35   Patella  42 42   10cm proximal to knee 46 47   20cm proximal to knee  53 49   Waist 90       Other Tests / Comments:      Pain Level (0-10 scale) post treatment: 0/10    ASSESSMENT/Changes in Function: 75 yo female who presents to [facility] with c/o april leg lymphedema. Pt reported that her swelling started before November/December last year.  Pt was unaware of exact time frame due to her taking care of her ill .  She reported that her cardiologist noticed it at her appt in December. Pt was cleared by cardio and pt reported that her PCP had referred to vascular. Pt reported that she did not have a doppler done at that time but was referred  to lymphedema therapy.  Pt reported that she went to wound care until the end of last month prior to vascular appt.  At that time vascular placed a 3 layer coban system to april LE and pt was unable to remove.  PT removed today and donned pts new compression garments. Patient reports Difficulty with wearing shoes. Patient demonstrates decreased strength, impaired fluid management, impaired posture, pain and decreased functional mobility tolerance.    Patient will continue to benefit from skilled PT services to modify and progress therapeutic interventions, analyze and address soft tissue restrictions and decrease fliud retension and improve tissue healing to attain remaining goals.     [x]  See Plan of Care  []  See progress note/recertification  []  See Discharge Summary         Progress towards goals / Updated goals:  Short Term Goals: To be accomplished in 4

## 2024-08-02 ENCOUNTER — HOSPITAL ENCOUNTER (OUTPATIENT)
Facility: HOSPITAL | Age: 74
Setting detail: RECURRING SERIES
Discharge: HOME | End: 2024-08-05
Payer: MEDICARE

## 2024-08-02 PROCEDURE — 97530 THERAPEUTIC ACTIVITIES: CPT

## 2024-08-02 PROCEDURE — 97110 THERAPEUTIC EXERCISES: CPT

## 2024-08-02 PROCEDURE — 97535 SELF CARE MNGMENT TRAINING: CPT

## 2024-08-02 NOTE — PROGRESS NOTES
dry needle or estim unattended, both untimed codes, in totals to left)  8-22 min = 1 unit; 23-37 min = 2 units; 38-52 min = 3 units; 53-67 min = 4 units; 68-82 min = 5 units   Total Total     TOTAL TREATMENT TIME:        40     [x]  Patient Education billed concurrently with other procedures   [x] Review HEP    [] Progressed/Changed HEP, detail:    [] Other detail:       Objective Information/Functional Measures/Assessment    Patient tolerated treatment session well today. Patient had no complaints with addition of AROM exercises to exercise program to accomplish improved activation of lymph system. Pt educated on risk reduction and skin care today.  Patient continues to make steady progress toward goals and would benefit from continued skilled PT intervention to address remaining deficits outlined in goals below.    Patient will continue to benefit from skilled PT services to modify and progress therapeutic interventions, analyze and address soft tissue restrictions and decrease fliud retension and improve tissue healing to attain remaining goals.    Progress toward goals / Updated goals:  []  See Progress Note/Recertification    Short Term Goals: To be accomplished in 4 treatments:  Patient will be independent and compliant with HEP to progress toward goals and restore functional mobility.   Eval Status: issued at eval     Patient will be independent and compliant with Self MLD to progress toward goals and improve independent management of Lymphedema in order to improve pain and dressing.     Eval Status: to be issued at future visit     Pt will be independent with teach back of lymphedema risk reduction techniques in order to self manage lymphedema at Discharge with decreased likelihood of return to swollen state.  Eval Status: to be given at visit 2  8/2/24: given today      Long Term Goals: To be accomplished in 8 treatments::  Patient will improve LLIS score by 10 % in order to maximize function and promote

## 2024-08-05 ENCOUNTER — APPOINTMENT (OUTPATIENT)
Facility: HOSPITAL | Age: 74
End: 2024-08-05
Payer: MEDICARE

## 2024-08-14 ENCOUNTER — HOSPITAL ENCOUNTER (OUTPATIENT)
Facility: HOSPITAL | Age: 74
Setting detail: RECURRING SERIES
Discharge: HOME | End: 2024-08-17
Payer: MEDICARE

## 2024-08-14 PROCEDURE — 97530 THERAPEUTIC ACTIVITIES: CPT

## 2024-08-14 PROCEDURE — 97110 THERAPEUTIC EXERCISES: CPT

## 2024-08-14 PROCEDURE — 97535 SELF CARE MNGMENT TRAINING: CPT

## 2024-08-14 NOTE — PROGRESS NOTES
PHYSICAL / OCCUPATIONAL THERAPY - DAILY TREATMENT NOTE     Patient Name: Shanna Edwards    Date: 2024    : 1950  Insurance: Payor: MEDICARE / Plan: MEDICARE PART A AND B / Product Type: *No Product type* /      Patient  verified Yes     Visit #   Current / Total 3 8   Time   In / Out 233 315   Pain   In / Out 0 0   Subjective Functional Status/Changes: Pt reported that she hasn't changed her garments since last visit due to her finger surgery.     Changes to:  Allergies, Med Hx, Sx Hx?   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     OBJECTIVE    Therapeutic Procedures:  Tx Min Billable or 1:1 Min (if diff from Tx Min) Procedure, Rationale, Specifics   17  92432 Therapeutic Exercise (timed):  increase ROM, strength, coordination, balance, and proprioception to improve patient's ability to progress to PLOF and address remaining functional goals. (see flow sheet as applicable)     Details if applicable:        15  85958 Self Care/Home Management (timed):  improve patient knowledge and understanding of activity modification, diagnosis/prognosis, and physical therapy expectations, procedures and progression  to improve patient's ability to progress to PLOF and address remaining functional goals.  (see flow sheet as applicable)     Details if applicable:     10  16759 Therapeutic Activity (timed):  use of dynamic activities replicating functional movements to increase ROM, strength, coordination, balance, and proprioception in order to improve patient's ability to progress to PLOF and address remaining functional goals.  (see flow sheet as applicable)      Details if applicable:  huber mckinnon    42  [facility] Totals Reminder: bill using total billable min of TIMED

## 2024-08-21 ENCOUNTER — HOSPITAL ENCOUNTER (OUTPATIENT)
Facility: HOSPITAL | Age: 74
Setting detail: RECURRING SERIES
Discharge: HOME | End: 2024-08-24
Payer: MEDICARE

## 2024-08-21 PROCEDURE — 97535 SELF CARE MNGMENT TRAINING: CPT

## 2024-08-21 PROCEDURE — 97110 THERAPEUTIC EXERCISES: CPT

## 2024-08-21 PROCEDURE — 97530 THERAPEUTIC ACTIVITIES: CPT

## 2024-08-21 NOTE — PROGRESS NOTES
PHYSICAL / OCCUPATIONAL THERAPY - DAILY TREATMENT NOTE     Patient Name: Shanna Edwards    Date: 2024    : 1950  Insurance: Payor: MEDICARE / Plan: MEDICARE PART A AND B / Product Type: *No Product type* /      Patient  verified Yes     Visit #   Current / Total 4 8   Time   In / Out 230 315   Pain   In / Out 0 0   Subjective Functional Status/Changes: Pt reported that she was DC from hand therapy    Changes to:  Allergies, Med Hx, Sx Hx?   no       TREATMENT AREA =  Lymphedema, not elsewhere classified [I89.0]    If an interpreting service is utilized for treatment of this patient, the contents of this document represent the material reviewed with the patient via the .     OBJECTIVE    Therapeutic Procedures:  Tx Min Billable or 1:1 Min (if diff from Tx Min) Procedure, Rationale, Specifics   10 10 75664 Therapeutic Exercise (timed):  increase ROM, strength, coordination, balance, and proprioception to improve patient's ability to progress to PLOF and address remaining functional goals. (see flow sheet as applicable)    Details if applicable:       10 10 51168 Therapeutic Activity (timed):  use of dynamic activities replicating functional movements to increase ROM, strength, coordination, balance, and proprioception in order to improve patient's ability to progress to PLOF and address remaining functional goals.  (see flow sheet as applicable)    Details if applicable:      34746 Self Care/Home Management (timed):  improve patient knowledge and understanding of activity modification and physical therapy expectations, procedures and progression  to improve patient's ability to progress to PLOF and address remaining functional goals.  (see flow sheet as applicable)     Details if applicable:     45 38 [facility] Totals Reminder: bill using total billable min of TIMED therapeutic procedures (example: do not include dry needle or estim unattended, both untimed codes, in totals to

## 2024-08-21 NOTE — PROGRESS NOTES
[facility] at [facility]  [address] [place] [address]  Ph [phone]  Fx [phone]    Physical Therapy Progress Note  Patient name: Shanna Edwards Start of Care: 2024   Referral source: Estephanie Burns* : 1950               Medical Diagnosis: Lymphedema, not elsewhere classified [I89.0]    Onset Date:23               Treatment Diagnosis: I89.0 Lymphedema, not elsewhere classified     Prior Hospitalization: see medical history Provider#: [id number]   Medications: Verified on Patient summary List      Comorbidities: Diabetes mellitus, Musculoskeletal disorders, and Social determinants of health: Physical activity, A fib, heart murmer( Dr Abdi), Htn, Kidney disease (Dr. Nichols), Liver disease, Infection in right finger, weight loss      Prior Level of Function: functionally independent, no AD, sedentary lifestyle     Visits from Start of Care: 4    Missed Visits: 0    Updated Goals/Measure of Progress: To be achieved in 4 treatments:    Short Term Goals: To be accomplished in 4 treatments:  Patient will be independent and compliant with HEP to progress toward goals and restore functional mobility.   Eval Status: issued at eval  24: Pt ind in performing GOAL MET      Patient will be independent and compliant with Self MLD to progress toward goals and improve independent management of Lymphedema in order to improve pain and dressing.     Eval Status: to be issued at future visit  24: taught through thigh     Pt will be independent with teach back of lymphedema risk reduction techniques in order to self manage lymphedema at Discharge with decreased likelihood of return to swollen state.  Eval Status: to be given at visit 2  24: pt was able to teach back skin care and exercise but needed help with compression postioning and hot/cold avoidance     Long Term Goals: To be accomplished in 8 treatments::  Patient will improve LLIS score by 10 % in order

## 2024-08-28 ENCOUNTER — HOSPITAL ENCOUNTER (OUTPATIENT)
Facility: HOSPITAL | Age: 74
Setting detail: RECURRING SERIES
Discharge: HOME | End: 2024-08-31
Payer: MEDICARE

## 2024-08-28 PROCEDURE — 97530 THERAPEUTIC ACTIVITIES: CPT

## 2024-08-28 NOTE — PROGRESS NOTES
treatment  Patient continues to make steady progress toward goals and would benefit from continued skilled PT intervention to address remaining deficits outlined in goals below.    Patient will continue to benefit from skilled PT services to modify and progress therapeutic interventions, analyze and address soft tissue restrictions and decrease fliud retension and improve tissue healing to attain remaining goals.    Progress toward goals / Updated goals:  []  See Progress Note/Recertification    Short Term Goals: To be accomplished in 4 treatments:  Patient will be independent and compliant with HEP to progress toward goals and restore functional mobility.   Eval Status: issued at eval  8/21/24: Pt ind in performing GOAL MET      Patient will be independent and compliant with Self MLD to progress toward goals and improve independent management of Lymphedema in order to improve pain and dressing.     Eval Status: to be issued at future visit  8/21/24: taught through thigh     Pt will be independent with teach back of lymphedema risk reduction techniques in order to self manage lymphedema at Discharge with decreased likelihood of return to swollen state.  Eval Status: to be given at visit 2  8/21/24: pt was able to teach back skin care and exercise but needed help with compression postioning and hot/cold avoidance     Long Term Goals: To be accomplished in 8 treatments::  Patient will improve LLIS score by 10 % in order to maximize function and promote patient satisfaction with overall outcome.  Eval Status: LLIS 25  8/21/24: 22 % PROGRESSING      Pt will have 5/5 april LE strength to return to goals of improved dressing.  Eval Status: to be taken at visit 2  8/2/24  Lower Extremity/Lumbar Left  (0-5) Right (0-5)   Hip Flexion (L1,2) 4 4   Knee Extension (L3,4) 4 4   Ankle Dorsiflexion (L4) 5 5   Hip Abduction 4 4    8/21/24: to be tested at future visit      Pt will be able to lucy/doff compression garment with Mod ind  in order to self manage lymphedema in the future with maintaining circumferential measurements needed to wear shoes.   Eval status: pt taught today and gave information on stocking patti   8/21/24: Pt is not donning or doffing garments Pt educated to try to lucy and doff them.   8/28/24: Reviewed today using AD     Pt will decrease total circumference measurements of april leg by 10 cm in order to improve pts ability for dressing.   Eval status:   Circumference measurements Left (cm) Right (cm)   Forefoot  22 22.2   Figure 8 29.5 29.5   Malleoli  23.5 23.5   10 cm proximal to malleoli 24.5 20   20 cm proximal to malleoli 33.5 30.5   10 cm distal to knee 39 35   Patella  42 42   10cm proximal to knee 46 47   20cm proximal to knee  53 49   Waist 90                  8/21/24: to be taken at next visit       PLAN  Yes  Continue plan of care  []  Upgrade activities as tolerated  []  Discharge due to :  []  Other:    Saskia Pan PT    8/28/2024    2:28 PM    Future Appointments   Date Time Provider Department Center   8/28/2024  2:30 PM Saskia Pan, PT [facility]   9/4/2024  3:10 PM Saskia Pan PT [facility]   9/11/2024  2:30 PM Saskia Pan PT [facility]   9/18/2024  2:30 PM Saskia Pan, PT [facility]   10/2/2024 11:10 AM Caitlin Willis, YURI - NP [facility]

## 2024-09-04 ENCOUNTER — HOSPITAL ENCOUNTER (OUTPATIENT)
Facility: HOSPITAL | Age: 74
Setting detail: RECURRING SERIES
Discharge: HOME | End: 2024-09-07
Payer: MEDICARE

## 2024-09-04 PROCEDURE — 97535 SELF CARE MNGMENT TRAINING: CPT

## 2024-09-04 PROCEDURE — 97110 THERAPEUTIC EXERCISES: CPT

## 2024-09-04 PROCEDURE — 97112 NEUROMUSCULAR REEDUCATION: CPT

## 2024-09-04 NOTE — PROGRESS NOTES
PHYSICAL / OCCUPATIONAL THERAPY - DAILY TREATMENT NOTE     Patient Name: Shanna Edwards    Date: 2024    : 1950  Insurance: Payor: MEDICARE / Plan: MEDICARE PART A AND B / Product Type: *No Product type* /      Patient  verified Yes     Visit #   Current / Total 6 8   Time   In / Out 315 356   Pain   In / Out 0 0   Subjective Functional Status/Changes: Pt reported that she was able to get her socks on and off this week    Changes to:  Allergies, Med Hx, Sx Hx?   no       TREATMENT AREA =  Lymphedema, not elsewhere classified [I89.0]    If an interpreting service is utilized for treatment of this patient, the contents of this document represent the material reviewed with the patient via the .     OBJECTIVE    Therapeutic Procedures:  Tx Min Billable or 1:1 Min (if diff from Tx Min) Procedure, Rationale, Specifics   15  77511 Therapeutic Exercise (timed):  increase ROM, strength, coordination, balance, and proprioception to improve patient's ability to progress to PLOF and address remaining functional goals. (see flow sheet as applicable)    Details if applicable:       8  93817 Neuromuscular Re-Education (timed):  improve balance, coordination, kinesthetic sense, posture, core stability and proprioception to improve patient's ability to develop conscious control of individual muscles and awareness of position of extremities in order to progress to PLOF and address remaining functional goals. (see flow sheet as applicable)    Details if applicable:     18  09384 Self Care/Home Management (timed):  improve patient knowledge and understanding of activity modification and physical therapy expectations, procedures and progression  to improve patient's ability to progress to PLOF and address remaining functional goals.  (see flow sheet as applicable)     Details if applicable:     41  [facility] Totals Reminder: bill using total billable min of TIMED therapeutic procedures (example: do not

## 2024-09-11 ENCOUNTER — APPOINTMENT (OUTPATIENT)
Facility: HOSPITAL | Age: 74
End: 2024-09-11
Payer: MEDICARE

## 2024-09-11 ENCOUNTER — HOSPITAL ENCOUNTER (OUTPATIENT)
Facility: HOSPITAL | Age: 74
Setting detail: RECURRING SERIES
End: 2024-09-11
Payer: MEDICARE

## 2024-09-11 ENCOUNTER — TELEPHONE (OUTPATIENT)
Facility: HOSPITAL | Age: 74
End: 2024-09-11

## 2024-09-11 NOTE — PROGRESS NOTES
[facility] at [facility]  [address] [place] [address]  Ph [phone]  Fx [phone]    Plan of Care/Statement of Necessity for Occupational Therapy Services        Patient name: Shanna Edwards Start of Care: 2024   Referral source: Jez Carrasquillo MD : 1950    Medical Diagnosis: ***   Onset Date:***    Treatment Diagnosis: Pain in right hand [M79.641]                                                 Prior Hospitalization: see medical history Provider#: [id number]   Medications: Verified on Patient Summary List   Insurance: Payor: MEDICARE / Plan: MEDICARE PART A AND B / Product Type: *No Product type* /       Comorbidities: {[facility] Comorbid:95506}    Prior Level of Function: ***    The Plan of Care and following information is based on the information from the initial evaluation.  Assessment/ key information: ***    Evaluation Complexity:  History:  {PT OP History:17105}; Examination:  {PT OP Examination:9701804976} ;Presentation:  {PT OP Presentation:14672} ;Clinical Decision Making:  {[facility] Outcomes:98321} FOTO score = an established functional score where 100 = no disability  Overall Complexity Rating: {PT OP Complexity:3924896590}    Problem List: {[facility] OT PROBLEM LIST:94905}   Treatment Plan may include any combination of the following: {[facility] POC Tx Plan:40848}  Vasopnuematic compression justification:  Per bilateral girth measures taken and listed above the edema is considered significant and having an impact on the patient's {[facility] VASO JUSTIFICATION:88981}  Patient / Family readiness to learn indicated by: {Outpt PT Patient Family Readiness to Learn:90369}  Persons(s) to be included in education:   {[facility] PT PERSONS EDUCATION:53945}  Barriers to Learning/Limitations: {barriers to learning/limitations:47305}  Measures taken if barriers to learning: ***  Patient Goal (s): “***”  Patient Self Reported Health Status: {Outpt PT rehab 
Right             Left  Flx/Ext                                                   Pro                                                             Sup                                                                   Wrist AROM       Right           Left   Flx                                                       Ext                                                         UD                                                        RD                                                             Pro                                                               Sup                                                                   Finger ROM        I             II           III           IV             V  MP                                                                                               PIP                                                                                              DIP                                                                                            SWIFT                                                                                                            Right                  Left  1st                                                                               2nd                                                                                 3rd                                                          Pinch     Right          Left  3pt                                                  2pt                                              Lat                                                          Sensation:    Observation:     Palpation:      Edema: Circumference    Right  Left   Styolid Level  ***cm  ***cm   MCP   ***cm  ***cm   PIP   ***cm  ***cm  Scar/Wound: size, color, drainage, adhesions, location: ***  Topical Changes: Color: *** Temperature: *** Other:***    Nine-Hole Peg Test:  Left= _____seconds  Right=_____seconds    ADLs  Feeding:        []MaxA   []ModA   []Gretel

## 2024-09-16 ENCOUNTER — TELEPHONE (OUTPATIENT)
Facility: HOSPITAL | Age: 74
End: 2024-09-16

## 2024-09-16 ENCOUNTER — HOSPITAL ENCOUNTER (OUTPATIENT)
Facility: HOSPITAL | Age: 74
Setting detail: RECURRING SERIES
End: 2024-09-16
Payer: MEDICARE

## 2024-09-16 NOTE — PROGRESS NOTES
units; 38-52 min = 3 units; 53-67 min = 4 units; 68-82 min = 5 units   Total Total     [x]  Patient Education billed concurrently with other procedures   [x] Review HEP    [] Progressed/Changed HEP, detail:    [] Other detail:             General Evaluation                                                                                                                      Finger ROM        I             II           III           IV             V  MP                                                                                               PIP                                                                                              DIP                                                                                            SWIFT                                                                                                            Right                  Left  1st                                                                               2nd                                                                                 3rd                                                          Pinch     Right          Left  3pt                                                  2pt                                              Lat                                                          Sensation:    Observation:     Palpation:      Edema: Circumference    Right  Left   Styolid Level  ***cm  ***cm   MCP   ***cm  ***cm   PIP   ***cm  ***cm  Scar/Wound: size, color, drainage, adhesions, location: ***  Topical Changes: Color: *** Temperature: *** Other:***    Nine-Hole Peg Test:  Left= _____seconds  Right=_____seconds    Pain Level (0-10 scale) post treatment: ***    ASSESSMENT/Changes in Function: Pt presents with ***    Patient will continue to benefit from skilled OT services to modify and progress therapeutic interventions, address functional mobility deficits, address ROM deficits, address strength deficits, analyze

## 2024-09-18 ENCOUNTER — HOSPITAL ENCOUNTER (OUTPATIENT)
Facility: HOSPITAL | Age: 74
Setting detail: RECURRING SERIES
Discharge: HOME | End: 2024-09-21
Payer: MEDICARE

## 2024-09-18 PROCEDURE — 97535 SELF CARE MNGMENT TRAINING: CPT

## 2024-09-18 PROCEDURE — 97110 THERAPEUTIC EXERCISES: CPT

## 2024-09-19 ENCOUNTER — HOSPITAL ENCOUNTER (OUTPATIENT)
Facility: HOSPITAL | Age: 74
Setting detail: RECURRING SERIES
Discharge: HOME | End: 2024-09-22
Payer: MEDICARE

## 2024-09-19 PROCEDURE — 97535 SELF CARE MNGMENT TRAINING: CPT

## 2024-09-19 PROCEDURE — 97110 THERAPEUTIC EXERCISES: CPT

## 2024-09-19 PROCEDURE — 97165 OT EVAL LOW COMPLEX 30 MIN: CPT

## 2024-10-02 ENCOUNTER — TELEPHONE (OUTPATIENT)
Facility: HOSPITAL | Age: 74
End: 2024-10-02

## 2024-10-02 ENCOUNTER — HOSPITAL ENCOUNTER (OUTPATIENT)
Facility: HOSPITAL | Age: 74
Setting detail: RECURRING SERIES
End: 2024-10-02
Payer: MEDICARE

## 2024-10-02 NOTE — PROGRESS NOTES
PHYSICAL / OCCUPATIONAL THERAPY - DAILY TREATMENT NOTE     Patient Name: Shanna Edwards    Date: 10/2/2024    : 1950  Insurance: Payor: MEDICARE / Plan: MEDICARE PART A AND B / Product Type: *No Product type* /      Patient  verified {YES/NO:24988}     Visit #   Current / Total *** ***   Time   In / Out *** ***   Pain   In / Out *** ***   Subjective Functional Status/Changes: ***   Changes to:  Allergies, Med Hx, Sx Hx?   {YES/NO DIET:543366892}       TREATMENT AREA =  Pain in right hand [M86.531]    If an interpreting service is utilized for treatment of this patient, the contents of this document represent the material reviewed with the patient via the .     OBJECTIVE    Modalities Rationale:     {[facility] Modality Rationale:38780} to improve patient's ability to progress to PLOF and address remaining functional goals.     min [] Estim Unattended, type/location:                                      []  w/ice    []  w/heat    min [] Estim Attended, type/location:                                     []  w/US     []  w/ice    []  w/heat    []  TENS insruct      min []  Mechanical Traction: type/lbs                   []  pro   []  sup   []  int   []  cont    []  before manual    []  after manual    min []  Ultrasound, settings/location:      min []  Iontophoresis w/ dexamethasone, location:                                               []  take home patch       []  in clinic        min  unbilled []  Ice     []  Heat    location/position:     min []  Paraffin,  details:     min []  Vasopneumatic Device, press/temp:     min []  Whirlpool / Fluido:    If using vaso (only need to measure limb vaso being performed on)      pre-treatment girth :       post-treatment girth :       measured at (landmark location) :      min []  Other:    Skin assessment post-treatment (if applicable):    []  intact    []  redness- no adverse reaction                 []redness - adverse reaction:

## 2024-10-14 ENCOUNTER — TELEPHONE (OUTPATIENT)
Facility: HOSPITAL | Age: 74
End: 2024-10-14

## 2024-10-21 ENCOUNTER — HOSPITAL ENCOUNTER (OUTPATIENT)
Facility: HOSPITAL | Age: 74
Setting detail: RECURRING SERIES
Discharge: HOME | End: 2024-10-24
Payer: MEDICARE

## 2024-10-21 PROCEDURE — 97110 THERAPEUTIC EXERCISES: CPT

## 2024-10-21 PROCEDURE — 97535 SELF CARE MNGMENT TRAINING: CPT

## 2024-10-21 NOTE — PROGRESS NOTES
[facility] at [facility]  [address] [place] [address]  Ph [phone]  Fx [phone]    Occupational Therapy Discharge Summary      Patient name: Shanna Edwards Start of Care: 24   Referral source: Jez Carrasquillo MD : 1950   Medical/Treatment Diagnosis: Pain in right hand [M79.641] Onset Date:24     Prior Hospitalization: see medical history Provider#: [id number]   Medications: Verified on Patient Summary List    Comorbidities:  Diabetes mellitus, Musculoskeletal disorders, and Social determinants of health: Physical activity   Prior Level of Function: pt had full use of the digits on right hand, pt is right handed     Visits from Start of Care: 2    Missed Visits: 0    Reporting Period : 24 to 10/21/24    Goals/Measure of Progress:  STG:  3 Weeks   Pt will be able to improve  strength to over 45# in pain free range to improve functional  strength when performing cooking tasks      Eval status: 20#  10/21/24: 25#   2. Pt will be able to improve active PIP flexion to 80 degrees to improve composite grasp on coffee mug  Eval status. 70   10/21/24: 68 degrees no change   3. 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 Pt has 68 degrees of motion at PIP joint, 25# of pain free  strength, 2# of 2 pt pinch. While below normal levels, pt indicates she has not issues, is able to complete all tasks at home. Pt no longer in need of skilled OT services and will be d/c at this time       ASSESSMENT/RECOMMENDATIONS:  [x]Discontinue therapy: [x]Patient has reached or is progressing toward set goals          SOPHIA Padilla,OTR/L, CHT  10/21/2024 2:48 PM

## 2024-10-21 NOTE — PROGRESS NOTES
PHYSICAL / OCCUPATIONAL THERAPY - DAILY TREATMENT NOTE     Patient Name: Shanna Edwards    Date: 10/21/2024    : 1950  Insurance: Payor: MEDICARE / Plan: MEDICARE PART A AND B / Product Type: *No Product type* /  2    Patient  verified Yes     Visit #   Current / Total 2 3   Time   In / Out 238 301   Pain   In / Out 0 0   Subjective Functional Status/Changes: \"I feel I can do everything now\"    Changes to:  Allergies, Med Hx, Sx Hx?   no       TREATMENT AREA =  Pain in right hand [M79.641]    If an interpreting service is utilized for treatment of this patient, the contents of this document represent the material reviewed with the patient via the .     OBJECTIVE        Therapeutic Procedures:  Tx Min Billable or 1:1 Min (if diff from Tx Min) Procedure, Rationale, Specifics   10  92979 Therapeutic Exercise (timed):  increase ROM, strength, coordination, balance, and proprioception to improve patient's ability to progress to PLOF and address remaining functional goals. (see flow sheet as applicable)    Details if applicable:       13  84669 Self Care/Home Management (timed):  improve patient knowledge and understanding of diagnosis/prognosis and physical therapy expectations, procedures and progression  to improve patient's ability to progress to PLOF and address remaining functional goals.  (see flow sheet as applicable)    Details if applicable:            \        23  [facility] Totals Reminder: bill using total billable min of TIMED therapeutic procedures (example: do not include dry needle or estim unattended, both untimed codes, in totals to left)  8-22 min = 1 unit; 23-37 min = 2 units; 38-52 min = 3 units; 53-67 min = 4 units; 68-82 min = 5 units   Total Total     TOTAL TREATMENT TIME:        23     [x]  Patient Education billed concurrently with other procedures   [x] Review HEP        Objective Information/Functional Measures/Assessment    Pt has been through course of 2 OT visits

## 2025-02-12 ENCOUNTER — OFFICE VISIT (OUTPATIENT)
Age: 75
End: 2025-02-12
Payer: MEDICARE

## 2025-02-12 ENCOUNTER — HOSPITAL ENCOUNTER (OUTPATIENT)
Facility: HOSPITAL | Age: 75
Setting detail: SPECIMEN
Discharge: HOME | End: 2025-02-15
Payer: MEDICARE

## 2025-02-12 VITALS
WEIGHT: 164.8 LBS | HEART RATE: 77 BPM | OXYGEN SATURATION: 99 % | BODY MASS INDEX: 28.13 KG/M2 | TEMPERATURE: 97.2 F | DIASTOLIC BLOOD PRESSURE: 80 MMHG | HEIGHT: 64 IN | SYSTOLIC BLOOD PRESSURE: 152 MMHG

## 2025-02-12 DIAGNOSIS — Z11.59 ENCOUNTER FOR SCREENING FOR OTHER VIRAL DISEASES: ICD-10-CM

## 2025-02-12 DIAGNOSIS — K74.69 OTHER CIRRHOSIS OF LIVER (HCC): ICD-10-CM

## 2025-02-12 DIAGNOSIS — K74.60 HEPATIC CIRRHOSIS, UNSPECIFIED HEPATIC CIRRHOSIS TYPE, UNSPECIFIED WHETHER ASCITES PRESENT (HCC): ICD-10-CM

## 2025-02-12 DIAGNOSIS — Z72.89 OTHER PROBLEMS RELATED TO LIFESTYLE: ICD-10-CM

## 2025-02-12 DIAGNOSIS — K74.60 HEPATIC CIRRHOSIS, UNSPECIFIED HEPATIC CIRRHOSIS TYPE, UNSPECIFIED WHETHER ASCITES PRESENT (HCC): Primary | ICD-10-CM

## 2025-02-12 LAB
ALBUMIN SERPL-MCNC: 3.8 G/DL (ref 3.4–5)
ALBUMIN/GLOB SERPL: 1.2 (ref 0.8–1.7)
ALP SERPL-CCNC: 170 U/L (ref 45–117)
ALT SERPL-CCNC: 27 U/L (ref 13–56)
ANION GAP SERPL CALC-SCNC: 7 MMOL/L (ref 3–18)
AST SERPL-CCNC: 18 U/L (ref 10–38)
BASOPHILS # BLD: 0.05 K/UL (ref 0–0.1)
BASOPHILS NFR BLD: 0.9 % (ref 0–2)
BILIRUB DIRECT SERPL-MCNC: 0.3 MG/DL (ref 0–0.2)
BILIRUB SERPL-MCNC: 0.9 MG/DL (ref 0.2–1)
BUN SERPL-MCNC: 38 MG/DL (ref 7–18)
BUN/CREAT SERPL: 21 (ref 12–20)
CALCIUM SERPL-MCNC: 9 MG/DL (ref 8.5–10.1)
CHLORIDE SERPL-SCNC: 105 MMOL/L (ref 100–111)
CO2 SERPL-SCNC: 27 MMOL/L (ref 21–32)
CREAT SERPL-MCNC: 1.8 MG/DL (ref 0.6–1.3)
DIFFERENTIAL METHOD BLD: ABNORMAL
EOSINOPHIL # BLD: 0.18 K/UL (ref 0–0.4)
EOSINOPHIL NFR BLD: 3.1 % (ref 0–5)
ERYTHROCYTE [DISTWIDTH] IN BLOOD BY AUTOMATED COUNT: 16.9 % (ref 11.6–14.5)
FERRITIN SERPL-MCNC: 36 NG/ML (ref 8–388)
GLOBULIN SER CALC-MCNC: 3.2 G/DL (ref 2–4)
GLUCOSE SERPL-MCNC: 103 MG/DL (ref 74–99)
HCT VFR BLD AUTO: 40.7 % (ref 35–45)
HGB BLD-MCNC: 13 G/DL (ref 12–16)
IMM GRANULOCYTES # BLD AUTO: 0.01 K/UL (ref 0–0.04)
IMM GRANULOCYTES NFR BLD AUTO: 0.2 % (ref 0–0.5)
INR PPP: 1.7 (ref 0.9–1.1)
IRON SATN MFR SERPL: 19 % (ref 20–50)
IRON SERPL-MCNC: 74 UG/DL (ref 50–175)
LYMPHOCYTES # BLD: 1.08 K/UL (ref 0.9–3.3)
LYMPHOCYTES NFR BLD: 18.8 % (ref 21–52)
MCH RBC QN AUTO: 30.1 PG (ref 24–34)
MCHC RBC AUTO-ENTMCNC: 31.9 G/DL (ref 31–37)
MCV RBC AUTO: 94.2 FL (ref 78–100)
MONOCYTES # BLD: 0.57 K/UL (ref 0.05–1.2)
MONOCYTES NFR BLD: 9.9 % (ref 3–10)
NEUTS SEG # BLD: 3.87 K/UL (ref 1.8–8)
NEUTS SEG NFR BLD: 67.1 % (ref 40–73)
NRBC # BLD: 0 K/UL (ref 0–0.01)
NRBC BLD-RTO: 0 PER 100 WBC
PLATELET # BLD AUTO: 204 K/UL (ref 135–420)
PMV BLD AUTO: 9.9 FL (ref 9.2–11.8)
POTASSIUM SERPL-SCNC: 4.6 MMOL/L (ref 3.5–5.5)
PROT SERPL-MCNC: 7 G/DL (ref 6.4–8.2)
PROTHROMBIN TIME: 20.5 SEC (ref 11.9–14.9)
RBC # BLD AUTO: 4.32 M/UL (ref 4.2–5.3)
SODIUM SERPL-SCNC: 139 MMOL/L (ref 136–145)
TIBC SERPL-MCNC: 395 UG/DL (ref 250–450)
WBC # BLD AUTO: 5.8 K/UL (ref 4.6–13.2)

## 2025-02-12 PROCEDURE — 85610 PROTHROMBIN TIME: CPT

## 2025-02-12 PROCEDURE — 80048 BASIC METABOLIC PNL TOTAL CA: CPT

## 2025-02-12 PROCEDURE — 86381 MITOCHONDRIAL ANTIBODY EACH: CPT

## 2025-02-12 PROCEDURE — 1123F ACP DISCUSS/DSCN MKR DOCD: CPT | Performed by: NURSE PRACTITIONER

## 2025-02-12 PROCEDURE — G0499 HEPB SCREEN HIGH RISK INDIV: HCPCS

## 2025-02-12 PROCEDURE — 83540 ASSAY OF IRON: CPT

## 2025-02-12 PROCEDURE — G8427 DOCREV CUR MEDS BY ELIG CLIN: HCPCS | Performed by: NURSE PRACTITIONER

## 2025-02-12 PROCEDURE — 1126F AMNT PAIN NOTED NONE PRSNT: CPT | Performed by: NURSE PRACTITIONER

## 2025-02-12 PROCEDURE — 85025 COMPLETE CBC W/AUTO DIFF WBC: CPT

## 2025-02-12 PROCEDURE — 36415 COLL VENOUS BLD VENIPUNCTURE: CPT

## 2025-02-12 PROCEDURE — 82728 ASSAY OF FERRITIN: CPT

## 2025-02-12 PROCEDURE — 86038 ANTINUCLEAR ANTIBODIES: CPT

## 2025-02-12 PROCEDURE — 83550 IRON BINDING TEST: CPT

## 2025-02-12 PROCEDURE — 3079F DIAST BP 80-89 MM HG: CPT | Performed by: NURSE PRACTITIONER

## 2025-02-12 PROCEDURE — 1160F RVW MEDS BY RX/DR IN RCRD: CPT | Performed by: NURSE PRACTITIONER

## 2025-02-12 PROCEDURE — 91200 LIVER ELASTOGRAPHY: CPT | Performed by: NURSE PRACTITIONER

## 2025-02-12 PROCEDURE — G8400 PT W/DXA NO RESULTS DOC: HCPCS | Performed by: NURSE PRACTITIONER

## 2025-02-12 PROCEDURE — 3077F SYST BP >= 140 MM HG: CPT | Performed by: NURSE PRACTITIONER

## 2025-02-12 PROCEDURE — G8419 CALC BMI OUT NRM PARAM NOF/U: HCPCS | Performed by: NURSE PRACTITIONER

## 2025-02-12 PROCEDURE — 1159F MED LIST DOCD IN RCRD: CPT | Performed by: NURSE PRACTITIONER

## 2025-02-12 PROCEDURE — 80076 HEPATIC FUNCTION PANEL: CPT

## 2025-02-12 PROCEDURE — 86708 HEPATITIS A ANTIBODY: CPT

## 2025-02-12 PROCEDURE — 99204 OFFICE O/P NEW MOD 45 MIN: CPT | Performed by: NURSE PRACTITIONER

## 2025-02-12 PROCEDURE — 1090F PRES/ABSN URINE INCON ASSESS: CPT | Performed by: NURSE PRACTITIONER

## 2025-02-12 PROCEDURE — 4004F PT TOBACCO SCREEN RCVD TLK: CPT | Performed by: NURSE PRACTITIONER

## 2025-02-12 PROCEDURE — 82107 ALPHA-FETOPROTEIN L3: CPT

## 2025-02-12 PROCEDURE — 86015 ACTIN ANTIBODY EACH: CPT

## 2025-02-12 PROCEDURE — 86803 HEPATITIS C AB TEST: CPT

## 2025-02-12 RX ORDER — LEVOFLOXACIN 250 MG/1
250 TABLET, FILM COATED ORAL DAILY
COMMUNITY
Start: 2024-05-17

## 2025-02-12 RX ORDER — CEFUROXIME AXETIL 500 MG/1
TABLET ORAL
COMMUNITY
Start: 2025-01-09 | End: 2025-02-12

## 2025-02-12 RX ORDER — SILDENAFIL 25 MG/1
TABLET, FILM COATED ORAL
COMMUNITY
Start: 2024-12-16

## 2025-02-12 RX ORDER — CIPROFLOXACIN 250 MG/1
250 TABLET, FILM COATED ORAL 2 TIMES DAILY
COMMUNITY
End: 2025-02-12

## 2025-02-12 RX ORDER — NITROFURANTOIN 25; 75 MG/1; MG/1
CAPSULE ORAL
COMMUNITY
Start: 2024-12-16 | End: 2025-02-12

## 2025-02-12 NOTE — PROGRESS NOTES
[facility]     Fidencio Saleem MD, FACP, MACG, FAASLD   MD Daniela Mi PA-C April S Ashworth, [facility]-BC   Amira Nguyen, [facility]   Susanne Juan, FNP-C  Chong Stoner, [facility]-C   Caitlin Willis, [facility]-BC         [facility]   at [facility]   [address], [address]   [place]  [address] [phone]   FAX: [phone]  [facility]   [address], [address]   [place]  [address] [phone]   FAX: [phone]     Patient Care Team:  Beatriz Huddleston DO as PCP - General (Family Medicine)  Theodora Suarez MD (Wound Care)    Patient Active Problem List   Diagnosis    Other cirrhosis of liver (HCC)     The clinicians listed above have asked me to see Shanna Edwards in consultation regarding management of 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 All medical records sent by the referring physicians were reviewed    The patient is a 74 y.o. female who was found to have cirrhosis on CT scan. The results are not available for review.     Serologic evaluation for markers of chronic liver disease has either not been performed or the results are not available.      The patient has not developed any of the major complications of cirrhosis to date.    Assessment of liver fibrosis with Fibroscan was performed in the office today. The result was 9.6 kPa which correlates with stage 3 fibrosis. The CAP score of 270 suggests there may be hepatic steatosis.    In the office today the patient has the following symptoms:  dry eyes, dry mouth, itching    The patient is not experiencing the following symptoms which are commonly seen in this liver disorder: fatigue, pain in the right side over the liver, yellowing of the eyes or skin, problems concentrating, swelling of

## 2025-02-13 LAB
HAV AB SER QL IA: NEGATIVE
HBV CORE AB SERPL QL IA: NEGATIVE
HBV SURFACE AB SER QL IA: NEGATIVE
HBV SURFACE AB SERPL IA-ACNC: <3.1 MIU/ML
HBV SURFACE AG SER QL: 0.17 INDEX
HBV SURFACE AG SER QL: NEGATIVE
HCV AB SERPL QL IA: NORMAL
HCV IGG SERPL QL IA: NON REACTIVE S/CO RATIO
HEP BS AB COMMENT: ABNORMAL
MITOCHONDRIA M2 IGG SER-ACNC: <20 UNITS (ref 0–20)
SMA IGG SER-ACNC: 2 UNITS (ref 0–19)

## 2025-02-18 PROBLEM — K74.69 OTHER CIRRHOSIS OF LIVER (HCC): Status: ACTIVE | Noted: 2025-02-18

## 2025-02-19 LAB
AFP L3 MFR SERPL: NORMAL % (ref 0–9.9)
AFP SERPL-MCNC: 1.6 NG/ML (ref 0–9.2)
ANA BY IFA: POSITIVE
HOMOGENEOUS PATTERN: ABNORMAL
Lab: ABNORMAL